# Patient Record
Sex: FEMALE | Race: WHITE | NOT HISPANIC OR LATINO | Employment: FULL TIME | URBAN - METROPOLITAN AREA
[De-identification: names, ages, dates, MRNs, and addresses within clinical notes are randomized per-mention and may not be internally consistent; named-entity substitution may affect disease eponyms.]

---

## 2020-10-30 ENCOUNTER — HOSPITAL ENCOUNTER (EMERGENCY)
Facility: HOSPITAL | Age: 30
Discharge: HOME | End: 2020-10-31
Attending: EMERGENCY MEDICINE | Admitting: EMERGENCY MEDICINE
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
## 2020-10-30 PROCEDURE — 99283 EMERGENCY DEPT VISIT LOW MDM: CPT

## 2020-10-31 VITALS
SYSTOLIC BLOOD PRESSURE: 126 MMHG | DIASTOLIC BLOOD PRESSURE: 87 MMHG | WEIGHT: 145 LBS | RESPIRATION RATE: 16 BRPM | HEART RATE: 73 BPM | OXYGEN SATURATION: 100 % | HEIGHT: 64 IN | BODY MASS INDEX: 24.75 KG/M2 | TEMPERATURE: 96.8 F

## 2020-10-31 LAB
ALBUMIN SERPL-MCNC: 4.7 G/DL (ref 3.5–5.2)
ALBUMIN/GLOB SERPL: 2.1 G/DL
ALP SERPL-CCNC: 48 U/L (ref 39–117)
ALT SERPL W P-5'-P-CCNC: 7 U/L (ref 1–33)
ANION GAP SERPL CALCULATED.3IONS-SCNC: 11 MMOL/L (ref 5–15)
AST SERPL-CCNC: 13 U/L (ref 1–32)
BILIRUB SERPL-MCNC: 0.4 MG/DL (ref 0–1.2)
BUN SERPL-MCNC: 14 MG/DL (ref 6–20)
BUN/CREAT SERPL: 18.2 (ref 7–25)
CALCIUM SPEC-SCNC: 9.6 MG/DL (ref 8.6–10.5)
CHLORIDE SERPL-SCNC: 104 MMOL/L (ref 98–107)
CO2 SERPL-SCNC: 26 MMOL/L (ref 22–29)
CREAT SERPL-MCNC: 0.77 MG/DL (ref 0.57–1)
DEPRECATED RDW RBC AUTO: 40 FL (ref 37–54)
ERYTHROCYTE [DISTWIDTH] IN BLOOD BY AUTOMATED COUNT: 11.7 % (ref 12.3–15.4)
GFR SERPL CREATININE-BSD FRML MDRD: 90 ML/MIN/1.73
GLOBULIN UR ELPH-MCNC: 2.2 GM/DL
GLUCOSE SERPL-MCNC: 133 MG/DL (ref 65–99)
HBV SURFACE AB SER RIA-ACNC: NORMAL
HBV SURFACE AG SERPL QL IA: NORMAL
HCT VFR BLD AUTO: 41.4 % (ref 34–46.6)
HCV AB SER DONR QL: NORMAL
HGB BLD-MCNC: 13.7 G/DL (ref 12–15.9)
HIV1+2 AB SER QL: NORMAL
MCH RBC QN AUTO: 30.7 PG (ref 26.6–33)
MCHC RBC AUTO-ENTMCNC: 33.1 G/DL (ref 31.5–35.7)
MCV RBC AUTO: 92.8 FL (ref 79–97)
PLATELET # BLD AUTO: 310 10*3/MM3 (ref 140–450)
PMV BLD AUTO: 9.2 FL (ref 6–12)
POTASSIUM SERPL-SCNC: 3.4 MMOL/L (ref 3.5–5.2)
PROT SERPL-MCNC: 6.9 G/DL (ref 6–8.5)
RBC # BLD AUTO: 4.46 10*6/MM3 (ref 3.77–5.28)
SODIUM SERPL-SCNC: 141 MMOL/L (ref 136–145)
WBC # BLD AUTO: 5.77 10*3/MM3 (ref 3.4–10.8)

## 2020-10-31 PROCEDURE — 87340 HEPATITIS B SURFACE AG IA: CPT | Performed by: PHYSICIAN ASSISTANT

## 2020-10-31 PROCEDURE — 86706 HEP B SURFACE ANTIBODY: CPT | Performed by: PHYSICIAN ASSISTANT

## 2020-10-31 PROCEDURE — 85027 COMPLETE CBC AUTOMATED: CPT | Performed by: PHYSICIAN ASSISTANT

## 2020-10-31 PROCEDURE — 86803 HEPATITIS C AB TEST: CPT | Performed by: PHYSICIAN ASSISTANT

## 2020-10-31 PROCEDURE — G0432 EIA HIV-1/HIV-2 SCREEN: HCPCS | Performed by: PHYSICIAN ASSISTANT

## 2020-10-31 PROCEDURE — 80053 COMPREHEN METABOLIC PANEL: CPT | Performed by: PHYSICIAN ASSISTANT

## 2021-10-03 PROCEDURE — U0004 COV-19 TEST NON-CDC HGH THRU: HCPCS

## 2022-05-20 ENCOUNTER — OFFICE VISIT (OUTPATIENT)
Dept: OBSTETRICS AND GYNECOLOGY | Facility: CLINIC | Age: 32
End: 2022-05-20

## 2022-05-20 VITALS — DIASTOLIC BLOOD PRESSURE: 80 MMHG | BODY MASS INDEX: 25.06 KG/M2 | SYSTOLIC BLOOD PRESSURE: 118 MMHG | WEIGHT: 146 LBS

## 2022-05-20 DIAGNOSIS — Z01.419 PAP TEST, AS PART OF ROUTINE GYNECOLOGICAL EXAMINATION: Primary | ICD-10-CM

## 2022-05-20 DIAGNOSIS — Z30.431 IUD CHECK UP: ICD-10-CM

## 2022-05-20 DIAGNOSIS — Z01.419 WOMEN'S ANNUAL ROUTINE GYNECOLOGICAL EXAMINATION: ICD-10-CM

## 2022-05-20 PROCEDURE — 99395 PREV VISIT EST AGE 18-39: CPT | Performed by: OBSTETRICS & GYNECOLOGY

## 2022-05-20 NOTE — PROGRESS NOTES
GYN Annual Exam     CC - Here for annual exam.        HPI  Ilsa Pablo is a 31 y.o. female, No obstetric history on file., who presents for annual well woman exam. Patient's last menstrual period was 05/15/2022 (exact date)..  Periods are absent secondary to birth control.  Dysmenorrhea:mild- moderate.  Patient reports problems with: none.  There were no changes to her medical or surgical history since her last visit.. Partner Status: Marital Status: .  She is sexually active. She has not had new partners since her last STD testing. She does not desire STD testing. She does not use condoms with IC..    Additional OB/GYN History   Current contraception: contraceptive methods: kyleena IUD.  Insertion date: 7/7/2020  Desires to: continue contraception  Last Pap : 6/22/20. Result: normal  Last Completed Pap Smear     This patient has no relevant Health Maintenance data.        History of abnormal Pap smear: yes - hpv positive more than 3 years ago, patient was not see us at the time.   Family history of uterine, colon, breast, or ovarian cancer: yes - [facility]- breast  Performs monthly Self-Breast Exam: yes  Exercises Regularly:no  Feelings of Anxiety or Depression: no  Tobacco Usage?: No   OB History    No obstetric history on file.         Health Maintenance   Topic Date Due   • Annual Gynecologic Pelvic and Breast Exam  Never done   • ANNUAL PHYSICAL  Never done   • PAP SMEAR  Never done   • COVID-19 Vaccine (3 - Booster for Moderna series) 07/09/2021   • INFLUENZA VACCINE  08/01/2022   • TDAP/TD VACCINES (2 - Td or Tdap) 10/30/2022   • HEPATITIS C SCREENING  Completed   • Pneumococcal Vaccine 0-64  Aged Out       The additional following portions of the patient's history were reviewed and updated as appropriate: allergies, current medications, past family history, past medical history, past social history, past surgical history and problem list.    Review of Systems   Constitutional: Negative for fever,  unexpected weight gain and unexpected weight loss.   Eyes: Negative for visual disturbance.   Respiratory: Negative for cough and shortness of breath.    Cardiovascular: Negative for chest pain.   Gastrointestinal: Negative for abdominal pain, blood in stool, constipation and diarrhea.   Endocrine: Negative for cold intolerance and heat intolerance.   Genitourinary: Negative for amenorrhea, breast discharge, breast lump, dyspareunia, menstrual problem and vaginal pain.   Musculoskeletal: Negative for back pain and myalgias.   Skin: Negative for dry skin and skin lesions.   Neurological: Negative for headache.   Psychiatric/Behavioral: Negative for suicidal ideas and depressed mood.         I have reviewed and agree with the HPI, ROS, and historical information as entered above. Gracy Srivastava MD    Objective   /80   Wt 66.2 kg (146 lb)   LMP 05/15/2022 (Exact Date)   BMI 25.06 kg/m²     Physical Exam  Vitals and nursing note reviewed. Exam conducted with a chaperone present.   Constitutional:       Appearance: She is well-developed.   HENT:      Head: Normocephalic and atraumatic.   Eyes:      Pupils: Pupils are equal, round, and reactive to light.   Neck:      Thyroid: No thyroid mass or thyromegaly.   Pulmonary:      Effort: Pulmonary effort is normal. No retractions.   Chest:      Chest wall: No mass.   Breasts:      Right: Normal. No mass, nipple discharge, skin change or tenderness.      Left: Normal. No mass, nipple discharge, skin change or tenderness.       Abdominal:      General: Bowel sounds are normal.      Palpations: Abdomen is soft. Abdomen is not rigid. There is no mass.      Tenderness: There is no abdominal tenderness. There is no guarding.      Hernia: No hernia is present. There is no hernia in the left inguinal area or right inguinal area.   Genitourinary:     Exam position: Lithotomy position.      Pubic Area: No rash.       Labia:         Right: No rash, tenderness or lesion.          Left: No rash, tenderness or lesion.       Urethra: No urethral pain or urethral swelling.      Vagina: Normal. No vaginal discharge or lesions.      Cervix: No cervical motion tenderness, discharge, lesion or cervical bleeding.      Uterus: Normal. Not enlarged, not fixed and not tender.       Adnexa:         Right: No mass, tenderness or fullness.          Left: No mass, tenderness or fullness.        Rectum: No external hemorrhoid.   Musculoskeletal:      Cervical back: Normal range of motion. No muscular tenderness.      Right lower leg: No edema.      Left lower leg: No edema.   Skin:     General: Skin is warm and dry.   Neurological:      Mental Status: She is alert and oriented to person, place, and time.      Motor: Motor function is intact.   Psychiatric:         Mood and Affect: Mood and affect normal.         Behavior: Behavior normal.     strings seen       Assessment and Plan    Problem List Items Addressed This Visit    None     Visit Diagnoses     Pap test, as part of routine gynecological examination    -  Primary    Relevant Orders    LIQUID-BASED PAP SMEAR, [facility] ([facility])    Women's annual routine gynecological examination        IUD check up              1. GYN annual well woman exam.   2. Preconceptual Counseling.  Discussed timed intercourse, regular cycles, prenatal vitamins, and when to call.  3. Reviewed monthly self breast exams.  Instructed to call with lumps, pain, or breast discharge.    4. Reviewed exercise as a preventative health measures.   5. Reccommended Flu Vaccine in Fall of each year.  6. RTC in 1 year or PRN with problems  7. Other: she may want her iud out within the year.   Return in about 1 year (around 5/20/2023) for Annual physical.      Gracy Srivastava MD  05/20/2022

## 2022-05-25 LAB — REF LAB TEST METHOD: NORMAL

## 2023-11-14 NOTE — PROGRESS NOTES
New Patient Office Visit      Date: 11/15/2023   Patient Name: Ilsa Bruno  : 1990   MRN: [id number]     Chief Complaint:    Chief Complaint   Patient presents with    New Patient Preventive Medicine Services       History of Present Illness: Ilsa Bruno is a 32 y.o. female who is here today for new patient preventative.       Subjective      HPI:  Pt is from [place], [place]. 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  No acute concerns. Has been in generally good health over the last year.     Noticed episodes of white discoloration in her fingertips a few times which was new for her. Showed a friend who is a physician and was told it was Raynauds. No family h/o autoimmune conditions that she is aware of apart from maternal grandmother who had thyroid disease and diabetes which runs on both sides of the family. States that her family members did not go to the doctor very often so healthy history is somewhat incomplete.     Does struggle with anxiety. Following with a therapist for this. Attributes most of her anxiety to work stress. Works 6 to 7 days per week running her pharmacy. Has been trying to work reducing stress. Sleeps generally good- 8 to 9 hours. Will still feel fatigued even after a full nights sleep.  Diet is generally healthy.  cooks most meals at home. Limiting alcohol now, 4 drinks/week or less.   Gets exercise, was going to [facility] 4 days/week over the last few months. Has not been going as much recently.   Follows w OB/Gyn for women's health. Pap is UTD for . Was HPV+. Had colposcopy which was normal.  Taking PNV and does desire pregnancy in the future.  Had outbreak of HSV-1 recently which was treated w one time rx of Valtrex. Initial episode.       Review of Systems:   +Fatigue, anxiety, raynauds phenomena, skin issues/unspecified rashes. Negative joint  swelling or stiffness, swallowing difficulties, constipation, diarrhea, no vision problem. Otherwise not pertinent unless noted above in HPI.     Past Medical History:   Past Medical History:   Diagnosis Date    Abnormal Pap smear of cervix     HPV (human papilloma virus) infection     PMS (premenstrual syndrome)     Period Sxs occur around time of cycle even if no bleeding. Changes in mood, bloating, cramping. Managable    Varicella Childhood       Past Surgical History:   Past Surgical History:   Procedure Laterality Date    COLPOSCOPY      INTRAUTERINE DEVICE INSERTION         Family History:   Family History   Problem Relation Age of Onset    Hypertension Mother     Hypertension Father     Diabetes Maternal Uncle     Diabetes Maternal Grandfather     Hypertension Paternal Grandfather     Diabetes Other     Heart disease Other     Hypertension Other     Thyroid disease Other     Breast cancer Other         Maternal Great GM       Social History:   Social History     Socioeconomic History    Marital status:    Tobacco Use    Smoking status: Former     Packs/day: 0.25     Years: 0.50     Additional pack years: 0.00     Total pack years: 0.13     Types: Cigarettes     Start date: 1/1/2020     Quit date: 4/1/2020     Years since quitting: 3.6     Passive exposure: Never    Smokeless tobacco: Never   Vaping Use    Vaping Use: Never used   Substance and Sexual Activity    Alcohol use: Yes     Alcohol/week: 4.0 standard drinks of alcohol     Types: 4 Glasses of wine per week     Comment: DAILY,     Drug use: Never    Sexual activity: Yes     Partners: Male     Birth control/protection: I.U.D.       Medications:     Current Outpatient Medications:     prenatal vitamin (prenatal, CLASSIC, vitamin) tablet, Take  by mouth Daily., Disp: , Rfl:     valACYclovir (VALTREX) 1000 MG tablet, 1 tablet., Disp: , Rfl:     Allergies:   No Known Allergies    Immunizations:  Immunization History   Administered Date(s)  Administered    COVID-19 (MODERNA) 1st,2nd,3rd Dose Monovalent 01/11/2021, 02/09/2021     Pap:  Up-To-Date   Last Completed Pap Smear            PAP SMEAR (Every 3 Years) Next due on 5/20/2025 05/20/2022  LIQUID-BASED PAP SMEAR, [facility] ([facility])                   Cancer Screenings: Negative breast, colon, ovarian cancer.  Hep C (Age 18-79 once):  previously negative  HIV (Age 15-65 once): previously negative  A1c: ordered  Lipid panel: ordered      Dermatology: No  Ophthalmologist: UTD on vision screening 20/20  Dentist: Established    Tobacco Use: Medium Risk (11/15/2023)    Patient History     Smoking Tobacco Use: Former     Smokeless Tobacco Use: Never     Passive Exposure: Never       Social History     Substance and Sexual Activity   Alcohol Use Yes    Alcohol/week: 4.0 standard drinks of alcohol    Types: 4 Glasses of wine per week    Comment: DAILY,         Social History     Substance and Sexual Activity   Drug Use Never        Diet/Physical activity: Healthy, . counseled on 11/15/23      Sexual Health: using contraception (Kyleena), not attempting pregnancy   Menopause: pre-menopausal  Menstrual Cycles: regular, last menstrual cycle: unknown    PHQ-9 Depression Screening  Little interest or pleasure in doing things? 2-->more than half the days   Feeling down, depressed, or hopeless? 1-->several days   Trouble falling or staying asleep, or sleeping too much? 3-->nearly every day   Feeling tired or having little energy? 3-->nearly every day   Poor appetite or overeating? 0-->not at all   Feeling bad about yourself - or that you are a failure or have let yourself or your family down? 1-->several days   Trouble concentrating on things, such as reading the newspaper or watching television? 1-->several days   Moving or speaking so slowly that other people could have noticed? Or the opposite - being so fidgety or restless that you have been moving around a lot more than usual? 0-->not at all   Thoughts  "that you would be better off dead, or of hurting yourself in some way? 0-->not at all   PHQ-9 Total Score 11   If you checked off any problems, how difficult have these problems made it for you to do your work, take care of things at home, or get along with other people? somewhat difficult   Follows w talk therapist        Objective     Physical Exam:  Vital Signs:   Vitals:    11/15/23 0742   BP: 122/78   BP Location: Left arm   Patient Position: Sitting   Cuff Size: Adult   Pulse: 88   Temp: 98.5 °F (36.9 °C)   TempSrc: Oral   SpO2: 99%   Weight: 71.8 kg (158 lb 4.8 oz)   Height: 162.6 cm (64.02\")   PainSc: 0-No pain     Body mass index is 27.16 kg/m².    Physical Exam  Constitutional:       Appearance: She is normal weight. She is not ill-appearing.   HENT:      Head: Normocephalic and atraumatic.      Right Ear: Tympanic membrane normal.      Left Ear: Tympanic membrane normal.      Mouth/Throat:      Mouth: Mucous membranes are moist.      Pharynx: Oropharynx is clear. No oropharyngeal exudate or posterior oropharyngeal erythema.   Eyes:      Extraocular Movements: Extraocular movements intact.      Conjunctiva/sclera: Conjunctivae normal.   Cardiovascular:      Rate and Rhythm: Normal rate and regular rhythm.      Heart sounds: Normal heart sounds.   Pulmonary:      Breath sounds: Normal breath sounds. No wheezing or rhonchi.   Abdominal:      General: Abdomen is flat.      Palpations: Abdomen is soft.      Tenderness: There is no abdominal tenderness.   Musculoskeletal:         General: Normal range of motion.      Cervical back: Normal range of motion and neck supple.   Lymphadenopathy:      Cervical: No cervical adenopathy.   Neurological:      General: No focal deficit present.      Mental Status: She is alert.   Psychiatric:         Mood and Affect: Mood normal.         Thought Content: Thought content normal.           Assessment / Plan      Assessment/Plan:   Diagnoses and all orders for this " visit:    1. Encounter to establish care (Primary)    2. Annual physical exam  -     CBC (No Diff); Future  -     Lipid Panel; Future  -     Hemoglobin A1c; Future  -     Comprehensive Metabolic Panel; Future  -     TSH; Future    3. Screening for deficiency anemia  -     CBC (No Diff); Future    4. Screening for lipid disorders  -     Lipid Panel; Future    5. Raynaud's phenomenon without gangrene  -     MELIA by IFA, Reflex 9-biomarkers profile; Future  -     Anti-DNA Antibody, Double-stranded; Future    6. Other fatigue  -     MELIA by IFA, Reflex 9-biomarkers profile; Future  -     Anti-DNA Antibody, Double-stranded; Future        Healthcare Maintenance:  Fasting labs ordered  Mammo to start at 40  Colon cancer screening at 45  Pap UTD (est w Ob/Gyn)  On PNV, no contraception  Encouraged healthy diet, regular physical activity  Flu vaccine UTD 2023/2024   Politely declines COVID vaccine, has had primary series    Raynauds Phenomena  Has +fatigue, rash, raynauds but only family h/o of thyroid disease and diabetes. Will screen w TSH, A1c and add MELIA and ds-DNA although low concern for lupus or other underlying autoimmune conditions. Counseled that Raynauds phenomena can be an isolated condition. FU if sx's worsen or if labs are concerning.     Fatigue  Likely multifactorial based on lifestyle, work stressors. Check labs as above to r/o secondary cause.     Counseling provided based on age appropriate USPSTF guidelines.  BMI is >= 25 and <30. (Overweight) The following options were offered after discussion;: exercise counseling/recommendations, nutrition counseling/recommendations, and information on healthy weight added to patient's after visit summary     Ilsa Bruno voices understanding and acceptance of this advice and will call back with any further questions or concerns. AVS with preventive healthcare tips printed for patient.         Follow Up:   Return in about 1 year (around 11/15/2024) for  Annual.        Constance Mcintyre DO  [facility] [place] [address]

## 2023-11-15 ENCOUNTER — OFFICE VISIT (OUTPATIENT)
Dept: FAMILY MEDICINE CLINIC | Facility: CLINIC | Age: 33
End: 2023-11-15
Payer: COMMERCIAL

## 2023-11-15 ENCOUNTER — LAB (OUTPATIENT)
Dept: LAB | Facility: HOSPITAL | Age: 33
End: 2023-11-15
Payer: COMMERCIAL

## 2023-11-15 VITALS
OXYGEN SATURATION: 99 % | HEART RATE: 88 BPM | BODY MASS INDEX: 27.02 KG/M2 | SYSTOLIC BLOOD PRESSURE: 122 MMHG | TEMPERATURE: 98.5 F | DIASTOLIC BLOOD PRESSURE: 78 MMHG | HEIGHT: 64 IN | WEIGHT: 158.3 LBS

## 2023-11-15 DIAGNOSIS — Z00.00 ANNUAL PHYSICAL EXAM: ICD-10-CM

## 2023-11-15 DIAGNOSIS — Z76.89 ENCOUNTER TO ESTABLISH CARE: Primary | ICD-10-CM

## 2023-11-15 DIAGNOSIS — Z13.220 SCREENING FOR LIPID DISORDERS: ICD-10-CM

## 2023-11-15 DIAGNOSIS — Z13.0 SCREENING FOR DEFICIENCY ANEMIA: ICD-10-CM

## 2023-11-15 DIAGNOSIS — R53.83 OTHER FATIGUE: ICD-10-CM

## 2023-11-15 DIAGNOSIS — I73.00 RAYNAUD'S PHENOMENON WITHOUT GANGRENE: ICD-10-CM

## 2023-11-15 LAB
ALBUMIN SERPL-MCNC: 5.3 G/DL (ref 3.5–5.2)
ALBUMIN/GLOB SERPL: 2.3 G/DL
ALP SERPL-CCNC: 45 U/L (ref 39–117)
ALT SERPL W P-5'-P-CCNC: 8 U/L (ref 1–33)
ANION GAP SERPL CALCULATED.3IONS-SCNC: 11 MMOL/L (ref 5–15)
AST SERPL-CCNC: 12 U/L (ref 1–32)
BILIRUB SERPL-MCNC: 0.6 MG/DL (ref 0–1.2)
BUN SERPL-MCNC: 13 MG/DL (ref 6–20)
BUN/CREAT SERPL: 12.7 (ref 7–25)
CALCIUM SPEC-SCNC: 10 MG/DL (ref 8.6–10.5)
CHLORIDE SERPL-SCNC: 104 MMOL/L (ref 98–107)
CHOLEST SERPL-MCNC: 168 MG/DL (ref 0–200)
CO2 SERPL-SCNC: 24 MMOL/L (ref 22–29)
CREAT SERPL-MCNC: 1.02 MG/DL (ref 0.57–1)
DEPRECATED RDW RBC AUTO: 37.8 FL (ref 37–54)
EGFRCR SERPLBLD CKD-EPI 2021: 76.1 ML/MIN/1.73
ERYTHROCYTE [DISTWIDTH] IN BLOOD BY AUTOMATED COUNT: 11.8 % (ref 12.3–15.4)
GLOBULIN UR ELPH-MCNC: 2.3 GM/DL
GLUCOSE SERPL-MCNC: 91 MG/DL (ref 65–99)
HBA1C MFR BLD: 5.1 % (ref 4.8–5.6)
HCT VFR BLD AUTO: 44.2 % (ref 34–46.6)
HDLC SERPL-MCNC: 60 MG/DL (ref 40–60)
HGB BLD-MCNC: 15.2 G/DL (ref 12–15.9)
LDLC SERPL CALC-MCNC: 96 MG/DL (ref 0–100)
LDLC/HDLC SERPL: 1.6 {RATIO}
MCH RBC QN AUTO: 30.1 PG (ref 26.6–33)
MCHC RBC AUTO-ENTMCNC: 34.4 G/DL (ref 31.5–35.7)
MCV RBC AUTO: 87.5 FL (ref 79–97)
PLATELET # BLD AUTO: 297 10*3/MM3 (ref 140–450)
PMV BLD AUTO: 10.3 FL (ref 6–12)
POTASSIUM SERPL-SCNC: 4.1 MMOL/L (ref 3.5–5.2)
PROT SERPL-MCNC: 7.6 G/DL (ref 6–8.5)
RBC # BLD AUTO: 5.05 10*6/MM3 (ref 3.77–5.28)
SODIUM SERPL-SCNC: 139 MMOL/L (ref 136–145)
TRIGL SERPL-MCNC: 59 MG/DL (ref 0–150)
TSH SERPL DL<=0.05 MIU/L-ACNC: 2.23 UIU/ML (ref 0.27–4.2)
VLDLC SERPL-MCNC: 12 MG/DL (ref 5–40)
WBC NRBC COR # BLD: 5.09 10*3/MM3 (ref 3.4–10.8)

## 2023-11-15 PROCEDURE — 83036 HEMOGLOBIN GLYCOSYLATED A1C: CPT | Performed by: STUDENT IN AN ORGANIZED HEALTH CARE EDUCATION/TRAINING PROGRAM

## 2023-11-15 PROCEDURE — 80061 LIPID PANEL: CPT | Performed by: STUDENT IN AN ORGANIZED HEALTH CARE EDUCATION/TRAINING PROGRAM

## 2023-11-15 PROCEDURE — 36415 COLL VENOUS BLD VENIPUNCTURE: CPT | Performed by: STUDENT IN AN ORGANIZED HEALTH CARE EDUCATION/TRAINING PROGRAM

## 2023-11-15 PROCEDURE — 86225 DNA ANTIBODY NATIVE: CPT | Performed by: STUDENT IN AN ORGANIZED HEALTH CARE EDUCATION/TRAINING PROGRAM

## 2023-11-15 PROCEDURE — 86038 ANTINUCLEAR ANTIBODIES: CPT | Performed by: STUDENT IN AN ORGANIZED HEALTH CARE EDUCATION/TRAINING PROGRAM

## 2023-11-15 PROCEDURE — 80050 GENERAL HEALTH PANEL: CPT | Performed by: STUDENT IN AN ORGANIZED HEALTH CARE EDUCATION/TRAINING PROGRAM

## 2023-11-15 RX ORDER — VALACYCLOVIR HYDROCHLORIDE 1 G/1
1000 TABLET, FILM COATED ORAL
COMMUNITY
Start: 2023-10-25

## 2023-11-15 RX ORDER — PRENATAL VIT NO.126/IRON/FOLIC 28MG-0.8MG
TABLET ORAL DAILY
COMMUNITY

## 2023-11-15 NOTE — ASSESSMENT & PLAN NOTE
Fasting labs ordered  Mammo to start at 40  Colon cancer screening at 45  Pap UTD (est w Ob/Gyn)  On PNV, no contraception  Encouraged healthy diet, regular physical activity  Flu vaccine UTD 2023/2024   Politely declines COVID vaccine, has had primary series

## 2023-11-16 LAB — DSDNA AB SER-ACNC: 1 IU/ML (ref 0–9)

## 2023-11-17 ENCOUNTER — PATIENT ROUNDING (BHMG ONLY) (OUTPATIENT)
Dept: FAMILY MEDICINE CLINIC | Facility: CLINIC | Age: 33
End: 2023-11-17
Payer: COMMERCIAL

## 2023-11-17 LAB
ANA SER QL IF: NEGATIVE
LABORATORY COMMENT REPORT: NORMAL

## 2024-05-14 ENCOUNTER — OFFICE VISIT (OUTPATIENT)
Dept: OBSTETRICS AND GYNECOLOGY | Facility: CLINIC | Age: 34
End: 2024-05-14
Payer: COMMERCIAL

## 2024-05-14 VITALS
DIASTOLIC BLOOD PRESSURE: 72 MMHG | WEIGHT: 158 LBS | SYSTOLIC BLOOD PRESSURE: 120 MMHG | HEIGHT: 64 IN | BODY MASS INDEX: 26.98 KG/M2

## 2024-05-14 DIAGNOSIS — Z30.09 FAMILY PLANNING: Primary | ICD-10-CM

## 2024-05-14 PROCEDURE — 99213 OFFICE O/P EST LOW 20 MIN: CPT | Performed by: OBSTETRICS & GYNECOLOGY

## 2024-05-14 NOTE — PROGRESS NOTES
Chief Complaint   Patient presents with    Follow-up       Subjective   HPI  Ilsa Bruno is a 33 y.o. female, , LMP was on Patient's last menstrual period was 2024 (exact date). who presents for preconceptual counseling.    Her periods are regular every 26 days, lasting 5 days.  Dysmenorrhea:mild, occurring first 1-2 days of flow. The patient reports additional symptoms as none.  She is currently trying to conceive. She has intercourse approximately every other day on daily during her ovulation cycle Her past medical history is noted for: no medical issues. She has not had a previous workup for infertility. Partner Status: Marital Status: . Her partner has not fathered children. His past medical history is notable for no medical issues. Her IUD was pulled 25 and she has been trying to conceive since then. She has positive ovulation predictor kits.    Her PCP did routine labs this past  that were normal.       Current Outpatient Medications on File Prior to Visit   Medication Sig Dispense Refill    prenatal vitamin (prenatal, CLASSIC, vitamin) tablet Take  by mouth Daily.      valACYclovir (VALTREX) 1000 MG tablet 1 tablet.       No current facility-administered medications on file prior to visit.        Additional OB/GYN History   Last Pap :   Last Completed Pap Smear            PAP SMEAR (Every 3 Years) Next due on 2022  LIQUID-BASED PAP SMEAR, [facility] ([facility])                  History of abnormal Pap smear: no  Exercises Regularly: yes  Feelings of Anxiety or Depression: no  Tobacco Usage?: No   OB History          0    Para   0    Term   0       0    AB   0    Living   0         SAB   0    IAB   0    Ectopic   0    Molar   0    Multiple   0    Live Births   0                  Current Outpatient Medications:     prenatal vitamin (prenatal, CLASSIC, vitamin) tablet, Take  by mouth Daily., Disp: , Rfl:     valACYclovir (VALTREX) 1000  "MG tablet, 1 tablet., Disp: , Rfl:      Past Medical History:   Diagnosis Date    Abnormal Pap smear of cervix     HPV (human papilloma virus) infection     PMS (premenstrual syndrome)     Period Sxs occur around time of cycle even if no bleeding. Changes in mood, bloating, cramping. Managable    Varicella Childhood        Past Surgical History:   Procedure Laterality Date    COLPOSCOPY      INTRAUTERINE DEVICE INSERTION         The additional following portions of the patient's history were reviewed and updated as appropriate: allergies, current medications, past medical history, past social history, past surgical history, and problem list.    Review of Systems   All other systems reviewed and are negative.    All other systems reviewed and are negative.     I have reviewed and agree with the HPI, ROS, and historical information as entered above. Gracy Srivastava MD      Objective   /72   Ht 162.6 cm (64\")   Wt 71.7 kg (158 lb)   LMP 04/29/2024 (Exact Date)   BMI 27.12 kg/m²     Physical Exam  Vitals and nursing note reviewed.   Constitutional:       Appearance: Normal appearance.   HENT:      Head: Normocephalic and atraumatic.   Eyes:      General: No scleral icterus.     Pupils: Pupils are equal, round, and reactive to light.   Pulmonary:      Effort: Pulmonary effort is normal.      Breath sounds: Normal breath sounds.   Abdominal:      General: Bowel sounds are normal. There is no distension.      Palpations: Abdomen is soft.      Tenderness: There is no abdominal tenderness.   Musculoskeletal:         General: Normal range of motion.      Cervical back: Normal range of motion and neck supple.      Right lower leg: No edema.      Left lower leg: No edema.   Skin:     General: Skin is warm and dry.   Neurological:      General: No focal deficit present.      Mental Status: She is alert and oriented to person, place, and time.   Psychiatric:         Mood and Affect: Mood normal.         Behavior: Behavior " normal. Behavior is cooperative.         Assessment & Plan     Assessment and Plan    Problem List Items Addressed This Visit    None  Visit Diagnoses       Family planning    -  Primary            Ovulatory cycles discussed with the patient.  She understands the concept of timed intercourse.  We also discussed the importance of prenatal vitamins and folic acid.  Reviewed that only 30% of people get pregnant by 3 months, 50% x 6 months, and 90% by year.  Should the patient attempt to conceive for greater than 1 year she should return to the clinic for evaluation.  SA is next step  FU for US  Discussed option of clomid if SA is normal before moving on to eval for endo/tubal patency.   No follow-ups on file.      Gracy Srivastava MD  05/14/2024

## 2024-05-28 ENCOUNTER — OFFICE VISIT (OUTPATIENT)
Dept: OBSTETRICS AND GYNECOLOGY | Facility: CLINIC | Age: 34
End: 2024-05-28
Payer: COMMERCIAL

## 2024-05-28 VITALS
WEIGHT: 158.6 LBS | HEIGHT: 64 IN | SYSTOLIC BLOOD PRESSURE: 122 MMHG | BODY MASS INDEX: 27.08 KG/M2 | DIASTOLIC BLOOD PRESSURE: 84 MMHG

## 2024-05-28 DIAGNOSIS — Z31.9 INFERTILITY MANAGEMENT: Primary | ICD-10-CM

## 2024-05-28 DIAGNOSIS — Z30.09 FAMILY PLANNING: ICD-10-CM

## 2024-05-28 PROCEDURE — 99213 OFFICE O/P EST LOW 20 MIN: CPT | Performed by: OBSTETRICS & GYNECOLOGY

## 2024-05-28 RX ORDER — CIPROFLOXACIN HYDROCHLORIDE 3.5 MG/ML
SOLUTION/ DROPS TOPICAL
COMMUNITY
Start: 2024-03-29

## 2024-05-28 NOTE — PROGRESS NOTES
Chief Complaint   Patient presents with    Follow-up     infertility       Subjective   HPI  Ilsa Bruno is a 33 y.o. female, . Her last LMP was Patient's last menstrual period was 2024 (exact date). who presents for follow up on infertility. Having menstrual cycles every 28-29 days.  Pt was last seen on 2024 by Dr. Srivastava for preconceptual counseling. At last appointment, Dr. Srivastava discussed option of clomid if SA is normal before moving on to eval for endo/tubal patency.       Pt having regular periods and having timed intercourse as well as positive ovulation kits. Pt has been trying to conceive since 2023 when she had her IUD pulled.     Pt's  semen analysis was done on 2024 and was abnormal and plans to repeat in 8 weeks.     At her last visit she was treated with expectant management.  The patient reports additional symptoms as none.        US was done today. Uterus normal, follicles on bilateral ovaries.     Additional OB/GYN History     Last Pap : 2022 neg, HPV neg  Last Completed Pap Smear            PAP SMEAR (Every 3 Years) Next due on 2022  LIQUID-BASED PAP SMEAR, [facility] ([facility])                    Last mammogram:   Last Completed Mammogram       This patient has no relevant Health Maintenance data.            Tobacco Usage?: No   OB History          0    Para   0    Term   0       0    AB   0    Living   0         SAB   0    IAB   0    Ectopic   0    Molar   0    Multiple   0    Live Births   0                  Current Outpatient Medications:     ciprofloxacin (CILOXAN) 0.3 % ophthalmic solution, , Disp: , Rfl:     prenatal vitamin (prenatal, CLASSIC, vitamin) tablet, Take  by mouth Daily., Disp: , Rfl:     valACYclovir (VALTREX) 1000 MG tablet, 1 tablet., Disp: , Rfl:      Past Medical History:   Diagnosis Date    Abnormal Pap smear of cervix     Herpes 10/2023    HPV (human papilloma virus) infection      "PMS (premenstrual syndrome)     Period Sxs occur around time of cycle even if no bleeding. Changes in mood, bloating, cramping. Managable    Varicella Childhood        Past Surgical History:   Procedure Laterality Date    COLPOSCOPY      INTRAUTERINE DEVICE INSERTION         The additional following portions of the patient's history were reviewed and updated as appropriate: allergies, current medications, past family history, past medical history, past social history, past surgical history, and problem list.    Review of Systems   Constitutional: Negative.    HENT: Negative.     Eyes: Negative.    Respiratory: Negative.     Cardiovascular: Negative.    Gastrointestinal: Negative.    Endocrine: Negative.    Genitourinary: Negative.    Musculoskeletal: Negative.    Skin: Negative.    Allergic/Immunologic: Negative.    Neurological: Negative.    Hematological: Negative.    Psychiatric/Behavioral: Negative.         I have reviewed and agree with the HPI, ROS, and historical information as entered above. Gracy Srivastava MD      Objective   /84   Ht 162.6 cm (64\")   Wt 71.9 kg (158 lb 9.6 oz)   LMP 05/28/2024 (Exact Date)   BMI 27.22 kg/m²     Physical Exam  Vitals and nursing note reviewed.   Constitutional:       Appearance: Normal appearance.   HENT:      Head: Normocephalic and atraumatic.   Eyes:      General: No scleral icterus.     Pupils: Pupils are equal, round, and reactive to light.   Pulmonary:      Effort: Pulmonary effort is normal.      Breath sounds: Normal breath sounds.   Abdominal:      General: Bowel sounds are normal. There is no distension.      Palpations: Abdomen is soft.      Tenderness: There is no abdominal tenderness.   Musculoskeletal:         General: Normal range of motion.      Cervical back: Normal range of motion and neck supple.      Right lower leg: No edema.      Left lower leg: No edema.   Skin:     General: Skin is warm and dry.   Neurological:      General: No focal deficit " present.      Mental Status: She is alert and oriented to person, place, and time.   Psychiatric:         Mood and Affect: Mood normal.         Behavior: Behavior normal. Behavior is cooperative.         Assessment & Plan     Assessment     Problem List Items Addressed This Visit    None  Visit Diagnoses       Infertility management    -  Primary    Relevant Orders    US Non-ob Transvaginal (Completed)    Family planning                Labs with PCP, US essentially normal today. She is having regular, ovulatory periods. SA abnl today.     Plan     All questions answered.  Will sched repeat SA in 8 weeks per Dr Celeste cedillo. Will FU after this is done to make plan. Discussed appt with MARIANA if repeat is abnl.   No follow-ups on file.  I spent 20 minutes caring for Ilsa on this date of service. This time includes time spent by me in the following activities:preparing for the visit, reviewing tests, obtaining and/or reviewing a separately obtained history, ordering medications, tests, or procedures, and documenting information in the medical record          Gracy Srivastava MD  05/28/2024

## 2024-07-18 ENCOUNTER — SPECIALTY PHARMACY (OUTPATIENT)
Dept: PHARMACY | Facility: TELEHEALTH | Age: 34
End: 2024-07-18
Payer: COMMERCIAL

## 2024-07-18 PROBLEM — N97.9 FEMALE INFERTILITY: Status: ACTIVE | Noted: 2024-07-18

## 2024-07-18 NOTE — PROGRESS NOTES
Specialty Pharmacy Patient Management Program  Initial Assessment     Ilsa Bruno is a 33 y.o. female with infertility and enrolled in the Patient Management program offered by [facility]. An initial outreach was conducted, including assessment of therapy appropriateness and specialty medication education for Ovidrel 250 mcg/0.5 ml injection. The patient was introduced to services offered by [facility], including: regular assessments, refill coordination, curbside pick-up or mail order delivery options, prior authorization maintenance, and financial assistance programs as applicable. The patient was also provided with contact information for the pharmacy team.     Insurance Coverage & Financial Support  [employer]      Relevant Past Medical History and Comorbidities  Relevant medical history and concomitant health conditions were discussed with the patient. The patient's chart has been reviewed for relevant past medical history and comorbid health conditions and updated as necessary.   Past Medical History:   Diagnosis Date    Abnormal Pap smear of cervix     Female infertility 2024    Herpes 10/2023    HPV (human papilloma virus) infection     PMS (premenstrual syndrome)     Period Sxs occur around time of cycle even if no bleeding. Changes in mood, bloating, cramping. Managable    Varicella Childhood     Social History     Socioeconomic History    Marital status:    Tobacco Use    Smoking status: Former     Current packs/day: 0.00     Average packs/day: 0.3 packs/day for 0.5 years (0.1 ttl pk-yrs)     Types: Cigarettes     Start date: 2020     Quit date: 2020     Years since quittin.2     Passive exposure: Never    Smokeless tobacco: Never   Vaping Use    Vaping status: Never Used   Substance and Sexual Activity    Alcohol use: Yes     Alcohol/week: 4.0 standard drinks of alcohol     Types: 4 Glasses of wine per week     Comment: DAILY,      Drug use: Never    Sexual activity: Yes     Partners: Male     Birth control/protection: None     Problem list reviewed by Lainey Schulz PharmD on 7/18/2024 at  1:58 PM    Allergies  Known allergies and reactions were discussed with the patient. The patient's chart has been reviewed for allergy information and updated as necessary.   Patient has no known allergies.  Allergies reviewed by Lainey Schulz PharmD on 7/18/2024 at  1:58 PM    Current Medication List  This medication list has been reviewed with the patient and evaluated for any interactions or necessary modifications/recommendations, and updated to include all prescription medications, OTC medications, and supplements the patient is currently taking. This list reflects what is contained in the patient's profile, which has also been marked as reviewed to communicate to other providers it is the most up to date version of the patient's current medication therapy.     Current Outpatient Medications:     Choriogonadotropin Collins (Ovidrel) 250 MCG/0.5ML injection, Inject 0.5 mL under the skin into the appropriate area as directed, Disp: 0.5 mL, Rfl: 5    ciprofloxacin (CILOXAN) 0.3 % ophthalmic solution, , Disp: , Rfl:     prenatal vitamin (prenatal, CLASSIC, vitamin) tablet, Take  by mouth Daily., Disp: , Rfl:     valACYclovir (VALTREX) 1000 MG tablet, 1 tablet., Disp: , Rfl:   Medicines reviewed by Lainey Schulz PharmD on 7/18/2024 at  1:58 PM    Drug Interactions  none     Relevant Laboratory Values  Lab Results   Component Value Date    GLUCOSE 91 11/15/2023    CALCIUM 10.0 11/15/2023     11/15/2023    K 4.1 11/15/2023    CO2 24.0 11/15/2023     11/15/2023    BUN 13 11/15/2023    CREATININE 1.02 (H) 11/15/2023    BCR 12.7 11/15/2023    ANIONGAP 11.0 11/15/2023     Lab Results   Component Value Date    WBC 5.09 11/15/2023    HGB 15.2 11/15/2023    HCT 44.2 11/15/2023    MCV 87.5 11/15/2023     11/15/2023     Lab Value Review  The  above lab values have been reviewed; the following specialty medication(s) dose adjustment(s) are recommended: none.    Initial Education Provided for Specialty Medication  The patient has been provided with the following education and any applicable administration techniques (i.e. self-injection) have been demonstrated for the therapies indicated. All questions and concerns have been addressed prior to the patient receiving the medication, and the patient has verbalized understanding of the education and any materials provided. Additional patient education shall be provided and documented upon request by the patient, provider or payer.        Human chorionic gonadotropin (Pregnyl, Novarel, Ovidrel)    Medication Expectations   Why am I taking this medication? You are taking this medication because you are trying to become pregnant.   What should I expect while on this medication? Triggered ovulation 24-36 hours after shot administered   How does the medication work? As part of an assisted reproductive technology (ART) program, induces ovulation (trigger shot) in infertile females who have been pretreated with follicle stimulating hormones (FSH); induces ovulation and pregnancy in infertile females when the cause of infertility is functional.   How long will I be on this medication for? The amount of time you will be on this medication will be determined by your doctor and your response to the medication.    How do I take this medication? Take as directed on your prescription label. This medication is an injectable.  You may have to mix this medication yourself or you may receive a prefilled pen.   What are some possible side effects? Potential side effects include headache, drowsiness or dizziness, vaginal bleeding, injection site reactions, upper respiratory tract infections, stomach pain, nausea, acne, breast tenderness, headache, mood changes, such as irritability, restless feelings, or anger, pain, irritation,  or inflammation at site where injected.   What happens if I miss a dose? If you miss a dose, you must call your doctor immediately.  They will instruct you on what to do.     Medication Safety   What are things I should warn my doctor immediately about? Hypersensitivity reactions - trouble breathing or swallowing. If you show signs of ovarian hyperstimulation syndrome (severe pain in your lower stomach, nausea, vomiting, diarrhea, bloating, rapid weight gain, little or no urination, or trouble breathing). Serious pulmonary complications such as fever with shortness of breath or rapid breathing. Signs of a blood clot, sudden numbness or weakness, problems with vision or speech, swelling or redness in an arm or leg.   What are things that I should be cautious of? Hypersensitivity reactions and injection site reactions.   What are some medications that can interact with this one? There are no significant drug interactions.     Medication Storage/Handling   How should I handle this medication? Keep this medication out of reach of pets/children in original container.   How does this medication need to be stored? Prefilled syringe: Prior to dispensing, store at 2°C to 8°C (36°F to 46°F). Patient may store at 25°C (77°F) for up to 30 days. Protect from light.   How should I dispose of this medication? Throw away any unused medicine after the expiration date.     Resources/Support   How can I remind myself to take this medication? There are several reminder apps available.  There are also printable calendars online.  Your doctor may also provide you with a detailed schedule for you to see.   Is financial support available?  There are a wide variety of discount cards available if your medication is not approved by your insurance.    Which vaccines are recommended for me? Talk to your doctor about these vaccines: Flu, Coronavirus (COVID-19), Pneumococcal (pneumonia), Tdap, Hepatitis B.       Adherence, Self-Administration,  and Current Therapy Problems  Adherence related to the patient's specialty therapy was discussed with the patient. The Adherence segment of this outreach has been reviewed and updated.          Additional Barriers to Patient Self-Administration: None  Methods for Supporting Patient Self-Administration: N/A    Open Medication Therapy Problems  No medication therapy recommendations to display    Goals of Therapy   Goals Addressed Today    None         Reassessment Plan & Follow-Up  Medication Therapy Changes: Patient initiating Ovidrel in the next week or 2. She will be taking with Clomid which she is receiving from another pharmacy. Instructed patient to call us if/when she needs a refill.  Additional Plans, Therapy Recommendations, or Therapy Problems to Be Addressed: none   Pharmacist to perform regular reassessments no more than (6) months from the previous assessment.  Welcome information and patient satisfaction survey to be sent by retail team with patient's initial fill.  Care Coordinator to set up future refill outreaches, coordinate prescription delivery, and escalate clinical questions to pharmacist.     Attestation  I attest the patient was actively involved in and has agreed to the above plan of care. I attest that the initiated specialty medication(s) are appropriate for the patient based on my assessment. If the prescribed therapy is at any point deemed not appropriate based on the current or future assessments, a consultation will be initiated with the patient's specialty care provider to determine the best course of action. The revised plan of therapy will be documented along with any reassessments and/or additional patient education provided.     Electronically signed by Lainey Schulz PharmD, 07/18/24, 2:03 PM EDT.

## 2024-07-19 ENCOUNTER — SPECIALTY PHARMACY (OUTPATIENT)
Dept: PHARMACY | Facility: HOSPITAL | Age: 34
End: 2024-07-19
Payer: COMMERCIAL

## 2024-07-23 ENCOUNTER — TRANSCRIBE ORDERS (OUTPATIENT)
Dept: ADMINISTRATIVE | Facility: HOSPITAL | Age: 34
End: 2024-07-23
Payer: COMMERCIAL

## 2024-07-23 DIAGNOSIS — Z31.41 FERTILITY TESTING: Primary | ICD-10-CM

## 2024-07-30 ENCOUNTER — HOSPITAL ENCOUNTER (OUTPATIENT)
Dept: GENERAL RADIOLOGY | Facility: HOSPITAL | Age: 34
Discharge: HOME | End: 2024-07-30
Payer: COMMERCIAL

## 2024-07-30 DIAGNOSIS — Z31.41 FERTILITY TESTING: ICD-10-CM

## 2024-07-30 PROCEDURE — 74740 X-RAY FEMALE GENITAL TRACT: CPT

## 2024-07-30 PROCEDURE — 25510000001 IOPAMIDOL 61 % SOLUTION: Performed by: SPECIALIST

## 2024-07-30 RX ADMIN — IOPAMIDOL 20 ML: 612 INJECTION, SOLUTION INTRAVENOUS at 08:38

## 2024-08-23 ENCOUNTER — SPECIALTY PHARMACY (OUTPATIENT)
Dept: PHARMACY | Facility: TELEHEALTH | Age: 34
End: 2024-08-23
Payer: COMMERCIAL

## 2024-08-23 NOTE — PROGRESS NOTES
Specialty Pharmacy Patient Management Program  Initial Assessment     Ilsa Bruno is a 33 y.o. female with female infertility and enrolled in the Patient Management program offered by [facility]. An initial outreach was conducted, including assessment of therapy appropriateness and specialty medication education for Ovidrel 250 mcg/0.5 ml . The patient was introduced to services offered by [facility], including: regular assessments, refill coordination, curbside pick-up or mail order delivery options, prior authorization maintenance, and financial assistance programs as applicable. The patient was also provided with contact information for the pharmacy team.     Insurance Coverage & Financial Support  [employer]      Relevant Past Medical History and Comorbidities  Relevant medical history and concomitant health conditions were discussed with the patient. The patient's chart has been reviewed for relevant past medical history and comorbid health conditions and updated as necessary.   Past Medical History:   Diagnosis Date    Abnormal Pap smear of cervix     Female infertility 2024    Herpes 10/2023    HPV (human papilloma virus) infection     PMS (premenstrual syndrome)     Period Sxs occur around time of cycle even if no bleeding. Changes in mood, bloating, cramping. Managable    Varicella Childhood     Social History     Socioeconomic History    Marital status:    Tobacco Use    Smoking status: Former     Current packs/day: 0.00     Average packs/day: 0.3 packs/day for 0.5 years (0.1 ttl pk-yrs)     Types: Cigarettes     Start date: 2020     Quit date: 2020     Years since quittin.3     Passive exposure: Never    Smokeless tobacco: Never   Vaping Use    Vaping status: Never Used   Substance and Sexual Activity    Alcohol use: Yes     Alcohol/week: 4.0 standard drinks of alcohol     Types: 4 Glasses of wine per week     Comment: DAILY,      Drug use: Never    Sexual activity: Yes     Partners: Male     Birth control/protection: None     Problem list reviewed by Lainey Schulz PharmD on 8/23/2024 at  3:40 PM    Allergies  Known allergies and reactions were discussed with the patient. The patient's chart has been reviewed for allergy information and updated as necessary.   Patient has no known allergies.  Allergies reviewed by Lainey Schulz PharmD on 8/23/2024 at  3:40 PM    Current Medication List  This medication list has been reviewed with the patient and evaluated for any interactions or necessary modifications/recommendations, and updated to include all prescription medications, OTC medications, and supplements the patient is currently taking. This list reflects what is contained in the patient's profile, which has also been marked as reviewed to communicate to other providers it is the most up to date version of the patient's current medication therapy.     Current Outpatient Medications:     Choriogonadotropin Collins (Ovidrel) 250 MCG/0.5ML injection, Inject 0.5 mL under the skin into the appropriate area as directed, Disp: 0.5 mL, Rfl: 5    ciprofloxacin (CILOXAN) 0.3 % ophthalmic solution, , Disp: , Rfl:     prenatal vitamin (prenatal, CLASSIC, vitamin) tablet, Take  by mouth Daily., Disp: , Rfl:     valACYclovir (VALTREX) 1000 MG tablet, 1 tablet., Disp: , Rfl:   No current facility-administered medications for this visit.  Medicines reviewed by Lainey Schulz PharmD on 8/23/2024 at  3:40 PM    Drug Interactions  none     Relevant Laboratory Values  Lab Results   Component Value Date    GLUCOSE 91 11/15/2023    CALCIUM 10.0 11/15/2023     11/15/2023    K 4.1 11/15/2023    CO2 24.0 11/15/2023     11/15/2023    BUN 13 11/15/2023    CREATININE 1.02 (H) 11/15/2023    BCR 12.7 11/15/2023    ANIONGAP 11.0 11/15/2023     Lab Results   Component Value Date    WBC 5.09 11/15/2023    HGB 15.2 11/15/2023    HCT 44.2 11/15/2023    MCV 87.5  11/15/2023     11/15/2023     Lab Value Review  The above lab values have been reviewed; the following specialty medication(s) dose adjustment(s) are recommended: none.    Initial Education Provided for Specialty Medication  The patient has been provided with the following education and any applicable administration techniques (i.e. self-injection) have been demonstrated for the therapies indicated. All questions and concerns have been addressed prior to the patient receiving the medication, and the patient has verbalized understanding of the education and any materials provided. Additional patient education shall be provided and documented upon request by the patient, provider or payer.        Human chorionic gonadotropin (Pregnyl, Novarel, Ovidrel)    Medication Expectations   Why am I taking this medication? You are taking this medication because you are trying to become pregnant.   What should I expect while on this medication? Triggered ovulation 24-36 hours after shot administered   How does the medication work? As part of an assisted reproductive technology (ART) program, induces ovulation (trigger shot) in infertile females who have been pretreated with follicle stimulating hormones (FSH); induces ovulation and pregnancy in infertile females when the cause of infertility is functional.   How long will I be on this medication for? The amount of time you will be on this medication will be determined by your doctor and your response to the medication.    How do I take this medication? Take as directed on your prescription label. This medication is an injectable.  You may have to mix this medication yourself or you may receive a prefilled pen.   What are some possible side effects? Potential side effects include headache, drowsiness or dizziness, vaginal bleeding, injection site reactions, upper respiratory tract infections, stomach pain, nausea, acne, breast tenderness, headache, mood changes, such as  irritability, restless feelings, or anger, pain, irritation, or inflammation at site where injected.   What happens if I miss a dose? If you miss a dose, you must call your doctor immediately.  They will instruct you on what to do.     Medication Safety   What are things I should warn my doctor immediately about? Hypersensitivity reactions - trouble breathing or swallowing. If you show signs of ovarian hyperstimulation syndrome (severe pain in your lower stomach, nausea, vomiting, diarrhea, bloating, rapid weight gain, little or no urination, or trouble breathing). Serious pulmonary complications such as fever with shortness of breath or rapid breathing. Signs of a blood clot, sudden numbness or weakness, problems with vision or speech, swelling or redness in an arm or leg.   What are things that I should be cautious of? Hypersensitivity reactions and injection site reactions.   What are some medications that can interact with this one? There are no significant drug interactions.     Medication Storage/Handling   How should I handle this medication? Keep this medication out of reach of pets/children in original container.   How does this medication need to be stored? Prefilled syringe: Prior to dispensing, store at 2°C to 8°C (36°F to 46°F). Patient may store at 25°C (77°F) for up to 30 days. Protect from light.   How should I dispose of this medication? Throw away any unused medicine after the expiration date.     Resources/Support   How can I remind myself to take this medication? There are several reminder apps available.  There are also printable calendars online.  Your doctor may also provide you with a detailed schedule for you to see.   Is financial support available?  There are a wide variety of discount cards available if your medication is not approved by your insurance.    Which vaccines are recommended for me? Talk to your doctor about these vaccines: Flu, Coronavirus (COVID-19), Pneumococcal  (pneumonia), Tdap, Hepatitis B.       Adherence, Self-Administration, and Current Therapy Problems  Adherence related to the patient's specialty therapy was discussed with the patient. The Adherence segment of this outreach has been reviewed and updated.          Additional Barriers to Patient Self-Administration: None  Methods for Supporting Patient Self-Administration: N/A    Open Medication Therapy Problems  No medication therapy recommendations to display    Goals of Therapy   Goals Addressed Today    None         Reassessment Plan & Follow-Up  Medication Therapy Changes: Patient needing another syringe of Ovidrel 250mcg to assist in becoming pregnant.  Additional Plans, Therapy Recommendations, or Therapy Problems to Be Addressed: none   Pharmacist to perform regular reassessments no more than (6) months from the previous assessment.  Welcome information and patient satisfaction survey to be sent by retail team with patient's initial fill.  Care Coordinator to set up future refill outreaches, coordinate prescription delivery, and escalate clinical questions to pharmacist.     Attestation  I attest the patient was actively involved in and has agreed to the above plan of care. I attest that the initiated specialty medication(s) are appropriate for the patient based on my assessment. If the prescribed therapy is at any point deemed not appropriate based on the current or future assessments, a consultation will be initiated with the patient's specialty care provider to determine the best course of action. The revised plan of therapy will be documented along with any reassessments and/or additional patient education provided.     Electronically signed by Lainey Schulz PharmD, 08/23/24, 3:43 PM EDT.

## 2024-10-14 ENCOUNTER — SPECIALTY PHARMACY (OUTPATIENT)
Dept: PHARMACY | Facility: TELEHEALTH | Age: 34
End: 2024-10-14
Payer: COMMERCIAL

## 2024-10-14 NOTE — PROGRESS NOTES
Specialty Pharmacy Patient Management Program  Initial Assessment     Ilsa Bruno is a 33 y.o. female with Female infertility and enrolled in the Patient Management program offered by [facility]. An initial outreach was conducted, including assessment of therapy appropriateness and specialty medication education for Ovidrel 250 mcg. The patient was introduced to services offered by [facility], including: regular assessments, refill coordination, curbside pick-up or mail order delivery options, prior authorization maintenance, and financial assistance programs as applicable. The patient was also provided with contact information for the pharmacy team.     Insurance Coverage & Financial Support  [employer]      Relevant Past Medical History and Comorbidities  Relevant medical history and concomitant health conditions were discussed with the patient. The patient's chart has been reviewed for relevant past medical history and comorbid health conditions and updated as necessary.   Past Medical History:   Diagnosis Date    Abnormal Pap smear of cervix     Female infertility 2024    Herpes 10/2023    HPV (human papilloma virus) infection     PMS (premenstrual syndrome)     Period Sxs occur around time of cycle even if no bleeding. Changes in mood, bloating, cramping. Managable    Varicella Childhood     Social History     Socioeconomic History    Marital status:    Tobacco Use    Smoking status: Former     Current packs/day: 0.00     Average packs/day: 0.3 packs/day for 0.5 years (0.1 ttl pk-yrs)     Types: Cigarettes     Start date: 2020     Quit date: 2020     Years since quittin.5     Passive exposure: Never    Smokeless tobacco: Never   Vaping Use    Vaping status: Never Used   Substance and Sexual Activity    Alcohol use: Yes     Alcohol/week: 4.0 standard drinks of alcohol     Types: 4 Glasses of wine per week     Comment: DAILY,     Drug use:  Never    Sexual activity: Yes     Partners: Male     Birth control/protection: None     Problem list reviewed by Isa Martinez RPH on 10/14/2024 at  9:17 AM    Allergies  Known allergies and reactions were discussed with the patient. The patient's chart has been reviewed for allergy information and updated as necessary.   Patient has no known allergies.  Allergies reviewed by Isa Martinez RPH on 10/14/2024 at  9:17 AM    Current Medication List  This medication list has been reviewed with the patient and evaluated for any interactions or necessary modifications/recommendations, and updated to include all prescription medications, OTC medications, and supplements the patient is currently taking. This list reflects what is contained in the patient's profile, which has also been marked as reviewed to communicate to other providers it is the most up to date version of the patient's current medication therapy.     Current Outpatient Medications:     Choriogonadotropin Collins (Ovidrel) 250 MCG/0.5ML injection, Inject 0.5 mL under the skin into the appropriate area as directed, Disp: 0.5 mL, Rfl: 5    ciprofloxacin (CILOXAN) 0.3 % ophthalmic solution, , Disp: , Rfl:     prenatal vitamin (prenatal, CLASSIC, vitamin) tablet, Take  by mouth Daily., Disp: , Rfl:     valACYclovir (VALTREX) 1000 MG tablet, 1 tablet., Disp: , Rfl:   Medicines reviewed by Isa Martinez RPH on 10/14/2024 at  9:17 AM    Drug Interactions  none     Relevant Laboratory Values  Lab Results   Component Value Date    GLUCOSE 91 11/15/2023    CALCIUM 10.0 11/15/2023     11/15/2023    K 4.1 11/15/2023    CO2 24.0 11/15/2023     11/15/2023    BUN 13 11/15/2023    CREATININE 1.02 (H) 11/15/2023    BCR 12.7 11/15/2023    ANIONGAP 11.0 11/15/2023     Lab Results   Component Value Date    WBC 5.09 11/15/2023    HGB 15.2 11/15/2023    HCT 44.2 11/15/2023    MCV 87.5 11/15/2023     11/15/2023     Lab Value Review  The above lab values  have been reviewed; the following specialty medication(s) dose adjustment(s) are recommended: none.    Initial Education Provided for Specialty Medication  The patient has been provided with the following education and any applicable administration techniques (i.e. self-injection) have been demonstrated for the therapies indicated. All questions and concerns have been addressed prior to the patient receiving the medication, and the patient has verbalized understanding of the education and any materials provided. Additional patient education shall be provided and documented upon request by the patient, provider or payer.        Human chorionic gonadotropin (Pregnyl, Novarel, Ovidrel)    Medication Expectations   Why am I taking this medication? You are taking this medication because you are trying to become pregnant.   What should I expect while on this medication? Triggered ovulation 24-36 hours after shot administered   How does the medication work? As part of an assisted reproductive technology (ART) program, induces ovulation (trigger shot) in infertile females who have been pretreated with follicle stimulating hormones (FSH); induces ovulation and pregnancy in infertile females when the cause of infertility is functional.   How long will I be on this medication for? The amount of time you will be on this medication will be determined by your doctor and your response to the medication.    How do I take this medication? Take as directed on your prescription label. This medication is an injectable.  You may have to mix this medication yourself or you may receive a prefilled pen.   What are some possible side effects? Potential side effects include headache, drowsiness or dizziness, vaginal bleeding, injection site reactions, upper respiratory tract infections, stomach pain, nausea, acne, breast tenderness, headache, mood changes, such as irritability, restless feelings, or anger, pain, irritation, or inflammation  at site where injected.   What happens if I miss a dose? If you miss a dose, you must call your doctor immediately.  They will instruct you on what to do.     Medication Safety   What are things I should warn my doctor immediately about? Hypersensitivity reactions - trouble breathing or swallowing. If you show signs of ovarian hyperstimulation syndrome (severe pain in your lower stomach, nausea, vomiting, diarrhea, bloating, rapid weight gain, little or no urination, or trouble breathing). Serious pulmonary complications such as fever with shortness of breath or rapid breathing. Signs of a blood clot, sudden numbness or weakness, problems with vision or speech, swelling or redness in an arm or leg.   What are things that I should be cautious of? Hypersensitivity reactions and injection site reactions.   What are some medications that can interact with this one? There are no significant drug interactions.     Medication Storage/Handling   How should I handle this medication? Keep this medication out of reach of pets/children in original container.   How does this medication need to be stored? Prefilled syringe: Prior to dispensing, store at 2°C to 8°C (36°F to 46°F). Patient may store at 25°C (77°F) for up to 30 days. Protect from light.   How should I dispose of this medication? Throw away any unused medicine after the expiration date.     Resources/Support   How can I remind myself to take this medication? There are several reminder apps available.  There are also printable calendars online.  Your doctor may also provide you with a detailed schedule for you to see.   Is financial support available?  There are a wide variety of discount cards available if your medication is not approved by your insurance.    Which vaccines are recommended for me? Talk to your doctor about these vaccines: Flu, Coronavirus (COVID-19), Pneumococcal (pneumonia), Tdap, Hepatitis B.       Adherence, Self-Administration, and Current  Therapy Problems  Adherence related to the patient's specialty therapy was discussed with the patient. The Adherence segment of this outreach has been reviewed and updated.   Additional Barriers to Patient Self-Administration: none  Methods for Supporting Patient Self-Administration: n/a    Open Medication Therapy Problems  No medication therapy recommendations to display    Goals of Therapy   Goals Addressed Today        Specialty Pharmacy General Goal      Successful ovulation              Reassessment Plan & Follow-Up  Medication Therapy Changes: None  Additional Plans, Therapy Recommendations, or Therapy Problems to Be Addressed: none   Pharmacist to perform regular reassessments no more than (6) months from the previous assessment.  Welcome information and patient satisfaction survey to be sent by retail team with patient's initial fill.  Care Coordinator to set up future refill outreaches, coordinate prescription delivery, and escalate clinical questions to pharmacist.     Attestation  I attest the patient was actively involved in and has agreed to the above plan of care. I attest that the initiated specialty medication(s) are appropriate for the patient based on my assessment. If the prescribed therapy is at any point deemed not appropriate based on the current or future assessments, a consultation will be initiated with the patient's specialty care provider to determine the best course of action. The revised plan of therapy will be documented along with any reassessments and/or additional patient education provided.     Electronically signed by Isa Martinez RPH, 10/14/24, 9:18 AM EDT.

## 2024-11-12 ENCOUNTER — LAB (OUTPATIENT)
Dept: LAB | Facility: HOSPITAL | Age: 34
End: 2024-11-12
Payer: COMMERCIAL

## 2024-11-12 ENCOUNTER — TRANSCRIBE ORDERS (OUTPATIENT)
Dept: LAB | Facility: HOSPITAL | Age: 34
End: 2024-11-12
Payer: COMMERCIAL

## 2024-11-12 DIAGNOSIS — Z32.00 PREGNANCY EXAMINATION OR TEST, PREGNANCY UNCONFIRMED: ICD-10-CM

## 2024-11-12 DIAGNOSIS — Z32.00 PREGNANCY EXAMINATION OR TEST, PREGNANCY UNCONFIRMED: Primary | ICD-10-CM

## 2024-11-12 LAB — HCG INTACT+B SERPL-ACNC: 549.4 MIU/ML

## 2024-11-12 PROCEDURE — 84702 CHORIONIC GONADOTROPIN TEST: CPT

## 2024-11-12 PROCEDURE — 36415 COLL VENOUS BLD VENIPUNCTURE: CPT

## 2024-11-19 ENCOUNTER — OFFICE VISIT (OUTPATIENT)
Dept: FAMILY MEDICINE CLINIC | Facility: CLINIC | Age: 34
End: 2024-11-19
Payer: COMMERCIAL

## 2024-11-19 VITALS
HEART RATE: 96 BPM | BODY MASS INDEX: 27.42 KG/M2 | SYSTOLIC BLOOD PRESSURE: 110 MMHG | HEIGHT: 64 IN | DIASTOLIC BLOOD PRESSURE: 82 MMHG | WEIGHT: 160.6 LBS | OXYGEN SATURATION: 98 %

## 2024-11-19 DIAGNOSIS — Z00.00 ANNUAL PHYSICAL EXAM: Primary | ICD-10-CM

## 2024-11-19 DIAGNOSIS — Z3A.01 LESS THAN 8 WEEKS GESTATION OF PREGNANCY: ICD-10-CM

## 2024-11-19 PROCEDURE — 99395 PREV VISIT EST AGE 18-39: CPT | Performed by: STUDENT IN AN ORGANIZED HEALTH CARE EDUCATION/TRAINING PROGRAM

## 2024-11-19 NOTE — PROGRESS NOTES
Female Physical Note      Date: 2024   Patient Name: Ilsa Bruno  : 1990   MRN: [id number]     Chief Complaint:    Chief Complaint   Patient presents with    Annual Exam       History of Present Illness: Ilsa Bruno is a 33 y.o. female who is here today for their annual health maintenance and physical.    HPI  Doing well since last visit. Just found out she is ~5 weeks pregnant! Had been seeing fertility specialist. She and  are extremely excited.   No acute concerns today.   Sleeping well at night.  Diet is healthy, staying active.   Work stress is manageable.   Planning to get flu shot today.  She is UTD on COVID vaccination.      Subjective      Review of Systems:   Review of Systems   Constitutional:  Negative for activity change and appetite change.   Psychiatric/Behavioral:  Negative for depressed mood.        Past Medical History, Social History, Family History and Care Team were all reviewed with patient and updated as appropriate.     Medications:     Current Outpatient Medications:     prenatal vitamin (prenatal, CLASSIC, vitamin) tablet, Take  by mouth Daily., Disp: , Rfl:     valACYclovir (VALTREX) 1000 MG tablet, 1 tablet., Disp: , Rfl:     Allergies:   No Known Allergies    Immunizations:  Td/Tdap(Booster Q 10 yrs):  2022  Flu (Yearly):  Will get today  Immunization History   Administered Date(s) Administered    COVID-19 (MODERNA) 1st,2nd,3rd Dose Monovalent 2021, 2021    COVID-19 (UNSPECIFIED) 2024    PPD Test 2013, 2014, 2016, 2017    Tdap 10/30/2012, 2022     Pap:  UTD   Last Completed Pap Smear            PAP SMEAR (Every 3 Years) Next due on 2022  LIQUID-BASED PAP SMEAR, [facility] ([facility])                   Lipid panel:   Lab Results   Component Value Date    CHOL 168 11/15/2023    TRIG 59 11/15/2023    HDL 60 11/15/2023    LDL 96 11/15/2023       The ASCVD Risk score (Jake QUIROGA,  "et al., 2019) failed to calculate for the following reasons:    The 2019 ASCVD risk score is only valid for ages 40 to 79      Tobacco Use: Medium Risk (11/19/2024)    Patient History     Smoking Tobacco Use: Former     Smokeless Tobacco Use: Never     Passive Exposure: Never       Social History     Substance and Sexual Activity   Alcohol Use Not Currently    Alcohol/week: 4.0 standard drinks of alcohol    Comment: DAILY,         Social History     Substance and Sexual Activity   Drug Use Never        Diet/Physical activity: Healthy    Sexual Health: not using contraception, pregnant    PHQ-2 Depression Screening  Little interest or pleasure in doing things? Not at all   Feeling down, depressed, or hopeless? Not at all   PHQ-2 Total Score 0     PHQ-9 Total Score:      Objective     Physical Exam:  Vital Signs:   Vitals:    11/19/24 0807   BP: 110/82   BP Location: Left arm   Patient Position: Sitting   Cuff Size: Adult   Pulse: 96   SpO2: 98%   Weight: 72.8 kg (160 lb 9.6 oz)   Height: 162.6 cm (64\")     Body mass index is 27.57 kg/m².     Physical Exam  Constitutional:       Appearance: She is normal weight. She is not ill-appearing.   HENT:      Head: Normocephalic and atraumatic.      Right Ear: Tympanic membrane normal.      Left Ear: Tympanic membrane normal.      Mouth/Throat:      Mouth: Mucous membranes are moist.      Pharynx: Oropharynx is clear. No oropharyngeal exudate or posterior oropharyngeal erythema.   Eyes:      Extraocular Movements: Extraocular movements intact.      Conjunctiva/sclera: Conjunctivae normal.   Cardiovascular:      Rate and Rhythm: Normal rate and regular rhythm.      Heart sounds: Normal heart sounds.   Pulmonary:      Breath sounds: Normal breath sounds. No wheezing or rhonchi.   Abdominal:      General: Abdomen is flat.      Palpations: Abdomen is soft.      Tenderness: There is no abdominal tenderness.   Musculoskeletal:         General: Normal range of motion.      Cervical " back: Normal range of motion and neck supple.   Lymphadenopathy:      Cervical: No cervical adenopathy.   Neurological:      General: No focal deficit present.      Mental Status: She is alert.   Psychiatric:         Mood and Affect: Mood normal.         Thought Content: Thought content normal.             Assessment / Plan      Assessment/Plan:   Diagnoses and all orders for this visit:    1. Annual physical exam (Primary)    2. Less than 8 weeks gestation of pregnancy      Age appropriate screenings and recommendations reviewed  Fasting labs done 2023, deferred today  Cont healthy diet, regular physical activity  UTD on Tdap, COVID vaccinations. Will get flu shot today.  Taking PNV    Healthcare Maintenance:  Counseling provided based on age appropriate USPSTF guidelines. Preventive counseling and anticipatory guidance discussed on the following topics: nutrition, physical activity, healthy weight, family planning/contraception, mental health, and immunizations    BMI is >= 25 and <30. (Overweight) The following options were offered after discussion;: information on healthy weight added to patient's after visit summary     Ilsa Bruno voices understanding and acceptance of this advice and will call back with any further questions or concerns. AVS with preventive healthcare tips printed for patient.         Follow Up:   Return in about 1 year (around 11/19/2025).          Constance Mcintyre DO  [facility] [place] [address]

## 2024-12-26 ENCOUNTER — INITIAL PRENATAL (OUTPATIENT)
Dept: OBSTETRICS AND GYNECOLOGY | Facility: CLINIC | Age: 34
End: 2024-12-26
Payer: COMMERCIAL

## 2024-12-26 VITALS — BODY MASS INDEX: 27.67 KG/M2 | DIASTOLIC BLOOD PRESSURE: 70 MMHG | WEIGHT: 161.2 LBS | SYSTOLIC BLOOD PRESSURE: 112 MMHG

## 2024-12-26 DIAGNOSIS — O98.519 HERPES SIMPLEX TYPE 2 (HSV-2) INFECTION AFFECTING PREGNANCY, ANTEPARTUM: ICD-10-CM

## 2024-12-26 DIAGNOSIS — B00.9 HERPES SIMPLEX TYPE 2 (HSV-2) INFECTION AFFECTING PREGNANCY, ANTEPARTUM: ICD-10-CM

## 2024-12-26 DIAGNOSIS — Z3A.10 10 WEEKS GESTATION OF PREGNANCY: ICD-10-CM

## 2024-12-26 DIAGNOSIS — Z34.91 PRENATAL CARE IN FIRST TRIMESTER: Primary | ICD-10-CM

## 2024-12-26 PROBLEM — Z00.00 ANNUAL PHYSICAL EXAM: Status: RESOLVED | Noted: 2023-11-15 | Resolved: 2024-12-26

## 2024-12-26 PROBLEM — N97.9 FEMALE INFERTILITY: Status: RESOLVED | Noted: 2024-07-18 | Resolved: 2024-12-26

## 2024-12-26 PROBLEM — Z34.90 PREGNANCY: Status: ACTIVE | Noted: 2024-12-26

## 2024-12-26 NOTE — PROGRESS NOTES
Initial ob visit     CC- Here for care of pregnancy        Ilsa Bruno is a 34 y.o. female, , who presents for her first obstetrical visit.  No LMP recorded. Patient is pregnant.. Her MAGGIE is 2025, by Est. Date of Conception. Current GA is 10w3d.     Patient had IUI on 10/28/2024 with Dr. Sher.     Initial positive test date : 2024, HCG        Her periods are every 4 weeks.  Prior obstetric issues: none  Patient's past medical history is significant for:  Infertility .  Family history of genetic issues (includes FOB): Denies  Prior infections concerning in pregnancy (Rash, fever in last 2 weeks): No  Varicella Hx - history of chicken pox  Prior testing for Cystic Fibrosis Carrier or Sickle Cell Trait- Denies  Prepregnancy BMI - Body mass index is 27.67 kg/m².  History of STD: yes HSV  Hx of HSV for patient or partner: yes - patient and partner  Ultrasound Today: yes    OB History    Para Term  AB Living   1 0 0 0 0 0   SAB IAB Ectopic Molar Multiple Live Births   0 0 0 0 0 0      # Outcome Date GA Lbr Dain/2nd Weight Sex Type Anes PTL Lv   1 Current                Additional Pertinent History   Last Pap : 2022 Result: negative HPV: negative     Last Completed Pap Smear            PAP SMEAR (Every 3 Years) Next due on 2022  LIQUID-BASED PAP SMEAR, [facility] ([facility])                  History of abnormal Pap smear: yes - colpo several years ago  Family history of uterine, colon, breast, or ovarian cancer: no  Feelings of Anxiety or Depression: no  Tobacco Usage?: No   Alcohol/Drug Use?: NO  Over the age of 35 at delivery: no  Genetic Screening: desires cell free DNA  Flu Status: Already given in current flu season    PMH    Current Outpatient Medications:     prenatal vitamin (prenatal, CLASSIC, vitamin) tablet, Take  by mouth Daily., Disp: , Rfl:     valACYclovir (VALTREX) 1000 MG tablet, 1 tablet. (Patient not taking: Reported on 2024),  Disp: , Rfl:      Past Medical History:   Diagnosis Date    Abnormal Pap smear of cervix     Female infertility 07/18/2024    Herpes 10/2023    History of medical problems 2023    Needle stick in 2020, signs of Raynauds (no diagnosis) this year in fingers and toes    HPV (human papilloma virus) infection     PMS (premenstrual syndrome)     Period Sxs occur around time of cycle even if no bleeding. Changes in mood, bloating, cramping. Managable    Pregnancy 12/26/2024    IUI    Varicella Childhood        Past Surgical History:   Procedure Laterality Date    COLPOSCOPY      INTRAUTERINE DEVICE INSERTION         Review of Systems   Review of Systems    Patient Reports:  Nausea  Patient Denies:excessive nausea , excessive vomiting, and vaginal bleeding  All systems reviewed and otherwise normal.    I have reviewed and agree with the HPI, ROS, and historical information as entered above. Gracy Srivastava MD      /70   Wt 73.1 kg (161 lb 3.2 oz)   BMI 27.67 kg/m²     The additional following portions of the patient's history were reviewed and updated as appropriate: allergies, current medications, past family history, past medical history, past social history, past surgical history, and problem list.    Physical Exam  General:  well developed; well nourished  no acute distress  mentation appropriate   Chest/Respiratory: No labored breathing, normal respiratory effort, normal appearance, no respiratory noises noted   Heart:  not examined   Thyroid: not examined   Breasts:  Not performed.   Abdomen: Not performed.   Pelvis: Not performed.        Assessment and Plan    Problem List Items Addressed This Visit          Gynecologic and Obstetric Problems    Herpes simplex type 2 (HSV-2) infection affecting pregnancy, antepartum    Overview     Supp 36 wks         Relevant Medications    valACYclovir (VALTREX) 1000 MG tablet       Other    Pregnancy    Overview     IUI  NIPT          Other Visit Diagnoses       Prenatal  care in first trimester    -  Primary    Relevant Orders    Obstetric Panel    HIV-1 / O / 2 Ag / Antibody    Urine Culture - Urine, Urine, Clean Catch    Chlamydia trachomatis, Neisseria gonorrhoeae, PCR - Urine, Urine, Clean Catch    Urine Drug Screen - Urine, Clean Catch            Pregnancy at 10w3d  Reviewed routine prenatal care with the office and educational materials given  Lab(s) Ordered  Discussed options for genetic testing including first trimester nuchal translucency screen, genetic disease carrier testing, quadruple screen, and NIPT  Patient is on Prenatal vitamins  Activity recommendation : 150 minutes/week of moderate intensity aerobic activity unless we limit for bleeding, hypertension or other pregnancy complication   Return in about 1 month (around 1/26/2025) for F/U Prenatal.      Gracy Srivastava MD  12/26/2024

## 2024-12-27 LAB
ABO GROUP BLD: NORMAL
AMPHETAMINES UR QL SCN: NORMAL NG/ML
BARBITURATES UR QL SCN: NORMAL
BASOPHILS # BLD AUTO: 0 X10E3/UL (ref 0–0.2)
BASOPHILS NFR BLD AUTO: 1 %
BENZODIAZ UR QL SCN: NORMAL
BLD GP AB SCN SERPL QL: NEGATIVE
BZE UR QL SCN: NORMAL
CANNABINOIDS UR QL SCN: NORMAL
EOSINOPHIL # BLD AUTO: 0 X10E3/UL (ref 0–0.4)
EOSINOPHIL NFR BLD AUTO: 0 %
ERYTHROCYTE [DISTWIDTH] IN BLOOD BY AUTOMATED COUNT: 11.7 % (ref 11.7–15.4)
HBV SURFACE AG SERPL QL IA: NEGATIVE
HCT VFR BLD AUTO: 39 % (ref 34–46.6)
HCV IGG SERPL QL IA: NON REACTIVE
HGB BLD-MCNC: 13 G/DL (ref 11.1–15.9)
HIV 1+2 AB+HIV1 P24 AG SERPL QL IA: NON REACTIVE
IMM GRANULOCYTES # BLD AUTO: 0 X10E3/UL (ref 0–0.1)
IMM GRANULOCYTES NFR BLD AUTO: 0 %
LYMPHOCYTES # BLD AUTO: 1.6 X10E3/UL (ref 0.7–3.1)
LYMPHOCYTES NFR BLD AUTO: 23 %
MCH RBC QN AUTO: 29.7 PG (ref 26.6–33)
MCHC RBC AUTO-ENTMCNC: 33.3 G/DL (ref 31.5–35.7)
MCV RBC AUTO: 89 FL (ref 79–97)
METHADONE UR QL SCN: NORMAL
MONOCYTES # BLD AUTO: 0.5 X10E3/UL (ref 0.1–0.9)
MONOCYTES NFR BLD AUTO: 8 %
NEUTROPHILS # BLD AUTO: 4.7 X10E3/UL (ref 1.4–7)
NEUTROPHILS NFR BLD AUTO: 68 %
OPIATES UR QL SCN: NORMAL
OXYCODONE+OXYMORPHONE UR QL SCN: NORMAL
PCP UR QL: NORMAL
PLATELET # BLD AUTO: 309 X10E3/UL (ref 150–450)
PROPOXYPH UR QL SCN: NORMAL
RBC # BLD AUTO: 4.38 X10E6/UL (ref 3.77–5.28)
RH BLD: POSITIVE
RPR SER QL: NON REACTIVE
RUBV IGG SERPL IA-ACNC: 1.96 INDEX
WBC # BLD AUTO: 6.9 X10E3/UL (ref 3.4–10.8)

## 2024-12-28 LAB
BACTERIA UR CULT: NO GROWTH
BACTERIA UR CULT: NORMAL
C TRACH RRNA SPEC QL NAA+PROBE: NEGATIVE
N GONORRHOEA RRNA SPEC QL NAA+PROBE: NEGATIVE

## 2025-01-02 ENCOUNTER — TELEPHONE (OUTPATIENT)
Dept: OBSTETRICS AND GYNECOLOGY | Facility: CLINIC | Age: 35
End: 2025-01-02
Payer: COMMERCIAL

## 2025-01-23 ENCOUNTER — ROUTINE PRENATAL (OUTPATIENT)
Dept: OBSTETRICS AND GYNECOLOGY | Facility: CLINIC | Age: 35
End: 2025-01-23
Payer: COMMERCIAL

## 2025-01-23 VITALS — SYSTOLIC BLOOD PRESSURE: 122 MMHG | DIASTOLIC BLOOD PRESSURE: 80 MMHG | BODY MASS INDEX: 27.29 KG/M2 | WEIGHT: 159 LBS

## 2025-01-23 DIAGNOSIS — O98.519 HERPES SIMPLEX TYPE 2 (HSV-2) INFECTION AFFECTING PREGNANCY, ANTEPARTUM: ICD-10-CM

## 2025-01-23 DIAGNOSIS — Z3A.14 14 WEEKS GESTATION OF PREGNANCY: ICD-10-CM

## 2025-01-23 DIAGNOSIS — B00.9 HERPES SIMPLEX TYPE 2 (HSV-2) INFECTION AFFECTING PREGNANCY, ANTEPARTUM: ICD-10-CM

## 2025-01-23 DIAGNOSIS — Z34.92 PRENATAL CARE, SECOND TRIMESTER: Primary | ICD-10-CM

## 2025-01-23 LAB
EXPIRATION DATE: 0
GLUCOSE UR STRIP-MCNC: NEGATIVE MG/DL
Lab: 0
PROT UR STRIP-MCNC: NEGATIVE MG/DL

## 2025-01-23 NOTE — PROGRESS NOTES
OB FOLLOW UP  CC- Here for care of pregnancy        Ilsa Bruno is a 34 y.o.  14w5d patient being seen today for her obstetrical follow up visit. Patient reports mild constipation (resolves on its own with hydration) and mild occasional cramping.     Her prenatal care is complicated by (and status) :   Patient Active Problem List   Diagnosis    Herpes simplex type 2 (HSV-2) infection affecting pregnancy, antepartum    Pregnancy       Genetic testing?: already completed and was normal.  NOB labs reviewed; UDS not ran d/t not having enough urine. Collected today  Flu Status: Already given in current flu season  Ultrasound Today: No    ROS -   Patient Denies: leaking of fluid, vaginal bleeding, dysuria, excessive vomiting, and more than 6 contractions per hour  All other systems reviewed and are negative.     The additional following portions of the patient's history were reviewed and updated as appropriate: allergies, current medications, past family history, past medical history, past social history, past surgical history, and problem list.    I have reviewed and agree with the HPI, ROS, and historical information as entered above. Gracy Srivastava MD          /80   Wt 72.1 kg (159 lb)   BMI 27.29 kg/m²         EXAM:     Prenatal Vitals  BP: 122/80  Weight: 72.1 kg (159 lb)              Urine Glucose Read-only: Negative  Urine Protein Read-only: Negative       Assessment and Plan    Problem List Items Addressed This Visit          Gynecologic and Obstetric Problems    Herpes simplex type 2 (HSV-2) infection affecting pregnancy, antepartum    Overview     Supp 36 wks         Relevant Medications    valACYclovir (VALTREX) 1000 MG tablet       Other    Pregnancy    Overview     IUI  NIPT low risk         Relevant Orders    POC Glucose, Urine, Qualitative, Dipstick (Completed)    POC Protein, Urine, Qualitative, Dipstick (Completed)    Urine Drug Screen - Urine, Clean Catch     Other Visit  Diagnoses       Prenatal care, second trimester    -  Primary    Relevant Orders    POC Glucose, Urine, Qualitative, Dipstick (Completed)    POC Protein, Urine, Qualitative, Dipstick (Completed)    Urine Drug Screen - Urine, Clean Catch    US Ob 14 + Weeks Single or First Gestation            Pregnancy at 14w5d  Labs reviewed from New OB Visit.  Counseled on genetic testing, carrier status and option for NT screen- done and low risk.   Activity and Exercise discussed.  Patient is on Prenatal vitamins  Return in about 5 weeks (around 2/27/2025) for F/U Prenatal, U/S Next Visit.    Gracy Srivastava MD  01/23/2025

## 2025-01-24 LAB
AMPHETAMINES UR QL SCN: NEGATIVE NG/ML
BARBITURATES UR QL SCN: NEGATIVE NG/ML
BENZODIAZ UR QL SCN: NEGATIVE NG/ML
BZE UR QL SCN: NEGATIVE NG/ML
CANNABINOIDS UR QL SCN: NEGATIVE NG/ML
CREAT UR-MCNC: 139.4 MG/DL (ref 20–300)
LABORATORY COMMENT REPORT: NORMAL
METHADONE UR QL SCN: NEGATIVE NG/ML
OPIATES UR QL SCN: NEGATIVE NG/ML
OXYCODONE+OXYMORPHONE UR QL SCN: NEGATIVE NG/ML
PCP UR QL: NEGATIVE NG/ML
PH UR: 6 [PH] (ref 4.5–8.9)
PROPOXYPH UR QL SCN: NEGATIVE NG/ML

## 2025-03-06 ENCOUNTER — ROUTINE PRENATAL (OUTPATIENT)
Dept: OBSTETRICS AND GYNECOLOGY | Facility: CLINIC | Age: 35
End: 2025-03-06
Payer: COMMERCIAL

## 2025-03-06 VITALS — BODY MASS INDEX: 27.67 KG/M2 | WEIGHT: 161.2 LBS | DIASTOLIC BLOOD PRESSURE: 78 MMHG | SYSTOLIC BLOOD PRESSURE: 112 MMHG

## 2025-03-06 DIAGNOSIS — B00.9 HERPES SIMPLEX TYPE 2 (HSV-2) INFECTION AFFECTING PREGNANCY, ANTEPARTUM: ICD-10-CM

## 2025-03-06 DIAGNOSIS — Z3A.20 20 WEEKS GESTATION OF PREGNANCY: ICD-10-CM

## 2025-03-06 DIAGNOSIS — Z34.92 PRENATAL CARE, SECOND TRIMESTER: Primary | ICD-10-CM

## 2025-03-06 DIAGNOSIS — O98.519 HERPES SIMPLEX TYPE 2 (HSV-2) INFECTION AFFECTING PREGNANCY, ANTEPARTUM: ICD-10-CM

## 2025-03-06 LAB
GLUCOSE UR STRIP-MCNC: NEGATIVE MG/DL
PROT UR STRIP-MCNC: NEGATIVE MG/DL

## 2025-03-06 NOTE — PROGRESS NOTES
OB FOLLOW UP  CC- Here for care of pregnancy        Ilsa Bruno is a 34 y.o.  20w5d patient being seen today for her obstetrical follow up visit. Patient reports mild intermittent cramping that mostly occurs with position changes.     Her prenatal care is complicated by (and status) :   Patient Active Problem List   Diagnosis    Herpes simplex type 2 (HSV-2) infection affecting pregnancy, antepartum    Pregnancy       Flu Status: Already given in current flu season  Ultrasound Today: Yes anatomy scan  AFP was declined.    ROS -     Patient Denies: leaking of fluid, vaginal bleeding, dysuria, excessive vomiting, and more than 6 contractions per hour  Fetal Movement : absent  Other than what is documented in the HPI, all other systems reviewed and are negative.       The additional following portions of the patient's history were reviewed and updated as appropriate: allergies, current medications, past family history, past medical history, past social history, past surgical history, and problem list.      I have reviewed and agree with the HPI, ROS, and historical information as entered above. Gracy Srivastava MD      /78   Wt 73.1 kg (161 lb 3.2 oz)   BMI 27.67 kg/m²       EXAM:     Prenatal Vitals  BP: 112/78  Weight: 73.1 kg (161 lb 3.2 oz)   Fetal Heart Rate: 148          Urine Glucose Read-only: Negative  Urine Protein Read-only: Negative       Assessment and Plan    Problem List Items Addressed This Visit          Gynecologic and Obstetric Problems    Herpes simplex type 2 (HSV-2) infection affecting pregnancy, antepartum    Overview     Supp 36 wks         Relevant Medications    valACYclovir (VALTREX) 1000 MG tablet    Other Relevant Orders    US Ob Follow Up Transabdominal Approach       Other    Pregnancy    Overview     IUI  NIPT low risk          Other Visit Diagnoses       Prenatal care, second trimester    -  Primary    Relevant Orders    POC Urinalysis Dipstick (Completed)    US  Ob Follow Up Transabdominal Approach            Pregnancy at 20w5d  Anatomy scan today is incomplete, follow up in 4 weeks for additional views. Anatomy that was visualized was within normal limits.  Fetal status reassuring.   Activity and Exercise discussed.  Patient is on Prenatal vitamins  Return in about 1 month (around 4/6/2025) for F/U Prenatal, U/S Next Visit.      Gracy Srivastava MD  03/06/2025

## 2025-04-10 ENCOUNTER — ROUTINE PRENATAL (OUTPATIENT)
Dept: OBSTETRICS AND GYNECOLOGY | Facility: CLINIC | Age: 35
End: 2025-04-10
Payer: COMMERCIAL

## 2025-04-10 VITALS — DIASTOLIC BLOOD PRESSURE: 60 MMHG | BODY MASS INDEX: 28.84 KG/M2 | SYSTOLIC BLOOD PRESSURE: 110 MMHG | WEIGHT: 168 LBS

## 2025-04-10 DIAGNOSIS — Z3A.25 25 WEEKS GESTATION OF PREGNANCY: ICD-10-CM

## 2025-04-10 DIAGNOSIS — O98.519 HERPES SIMPLEX TYPE 2 (HSV-2) INFECTION AFFECTING PREGNANCY, ANTEPARTUM: ICD-10-CM

## 2025-04-10 DIAGNOSIS — B00.9 HERPES SIMPLEX TYPE 2 (HSV-2) INFECTION AFFECTING PREGNANCY, ANTEPARTUM: ICD-10-CM

## 2025-04-10 DIAGNOSIS — Z34.90 PRENATAL CARE, ANTEPARTUM, UNSPECIFIED GRAVIDITY: Primary | ICD-10-CM

## 2025-04-10 LAB
GLUCOSE UR STRIP-MCNC: NEGATIVE MG/DL
PROT UR STRIP-MCNC: NEGATIVE MG/DL

## 2025-04-10 NOTE — PROGRESS NOTES
"    OB FOLLOW UP  CC- Here for care of pregnancy        Ilsa Bruno is a 34 y.o.  25w5d patient being seen today for her obstetrical follow up visit. Patient reports {lexob pregnancy problems before 24 weeks:35714}.     Her prenatal care is complicated by (and status) : see below.  Patient Active Problem List   Diagnosis    Herpes simplex type 2 (HSV-2) infection affecting pregnancy, antepartum    Pregnancy     Ultrasound Today: Yes  Reviewed 1 hr glucose testing and TDAP next visit.    ROS -   Patient Denies: {lexob denies before 24 weeks:70888}  Fetal Movement : {desc; normal/decreased:24921}  Other than what is documented in the HPI, all other systems reviewed and are negative.       The additional following portions of the patient's history were reviewed and updated as appropriate: allergies, current medications, and problem list.      I have reviewed and agree with the HPI, ROS, and historical information as entered above. ***    There were no vitals taken for this visit.      EXAM:                                 Assessment and Plan    Problem List Items Addressed This Visit    None  Visit Diagnoses         Prenatal care, antepartum, unspecified     -  Primary    Relevant Orders    POC Urinalysis Dipstick            Pregnancy at 25w5d  Fetal status reassuring.  {follow up anatomy:63281}  {28wk labs [facility] (Optional):55967::\"1 hour gtt\",\"CBC\",\"Antibody screen\",\"TDAP\",\"RPR\"} next visit. Instructions given  {pregnancy plan 24w lexob:43635::\"Discussed/encouraged TDAP vaccination after 28 weeks\"}  Activity and Exercise discussed.  No follow-ups on file.      Anjelica Garcia RN  04/10/2025    "

## 2025-04-10 NOTE — PROGRESS NOTES
OB FOLLOW UP  CC- Here for care of pregnancy        Ilsa Bruno is a 34 y.o.  25w5d patient being seen today for her obstetrical follow up visit. Patient reports no complaints.     Her prenatal care is complicated by (and status) : see below.  Patient Active Problem List   Diagnosis    Herpes simplex type 2 (HSV-2) infection affecting pregnancy, antepartum    Pregnancy       Flu Status: Already given in current flu season  Ultrasound Today: Yes. All anatomy visualized today and appears normal. EFW 74%, AC 94%  Reviewed 1 hr glucose testing and TDAP next visit.    ROS -   Patient Denies: leaking of fluid, vaginal bleeding, dysuria, excessive vomiting, and more than 6 contractions per hour  Fetal Movement : normal  Other than what is documented in the HPI, all other systems reviewed and are negative.       The additional following portions of the patient's history were reviewed and updated as appropriate: allergies, current medications, past medical history, past social history, past surgical history, and problem list.      I have reviewed and agree with the HPI, ROS, and historical information as entered above. Norma Cadena, APRN      /60   Wt 76.2 kg (168 lb)   BMI 28.84 kg/m²       EXAM:     Prenatal Vitals  BP: 110/60  Weight: 76.2 kg (168 lb)   Fetal Heart Rate: 141               Urine Glucose Read-only: Negative  Urine Protein Read-only: Negative       Assessment and Plan    Problem List Items Addressed This Visit       Herpes simplex type 2 (HSV-2) infection affecting pregnancy, antepartum    Overview   Supp 36 wks         Relevant Medications    valACYclovir (VALTREX) 1000 MG tablet    Pregnancy    Overview   IUI  NIPT low risk          Other Visit Diagnoses         Prenatal care, antepartum, unspecified     -  Primary    Relevant Orders    POC Urinalysis Dipstick (Completed)            Pregnancy at 25w5d  Fetal status reassuring.  anatomy scan completed today and  within normal limits.  1 hour gtt, CBC, Antibody screen, TDAP, and RPR next visit. Instructions given  Fetal movement/PTL or Labor precautions  Reviewed routine prenatal care with the office and educational materials given  Discussed/encouraged TDAP vaccination after 28 weeks  Activity and Exercise discussed.  Return in about 3 weeks (around 5/1/2025) for 1 hour patricia.      Norma Cadena, APRN  04/10/2025

## 2025-05-06 ENCOUNTER — ROUTINE PRENATAL (OUTPATIENT)
Dept: OBSTETRICS AND GYNECOLOGY | Facility: CLINIC | Age: 35
End: 2025-05-06
Payer: COMMERCIAL

## 2025-05-06 VITALS — SYSTOLIC BLOOD PRESSURE: 122 MMHG | WEIGHT: 172.2 LBS | DIASTOLIC BLOOD PRESSURE: 70 MMHG | BODY MASS INDEX: 29.56 KG/M2

## 2025-05-06 DIAGNOSIS — Z3A.29 29 WEEKS GESTATION OF PREGNANCY: ICD-10-CM

## 2025-05-06 DIAGNOSIS — O98.519 HERPES SIMPLEX TYPE 2 (HSV-2) INFECTION AFFECTING PREGNANCY, ANTEPARTUM: ICD-10-CM

## 2025-05-06 DIAGNOSIS — B00.9 HERPES SIMPLEX TYPE 2 (HSV-2) INFECTION AFFECTING PREGNANCY, ANTEPARTUM: ICD-10-CM

## 2025-05-06 DIAGNOSIS — Z34.93 PRENATAL CARE IN THIRD TRIMESTER, UNSPECIFIED GRAVIDITY: Primary | ICD-10-CM

## 2025-05-06 LAB
GLUCOSE UR STRIP-MCNC: NEGATIVE MG/DL
PROT UR STRIP-MCNC: NEGATIVE MG/DL

## 2025-05-06 NOTE — PROGRESS NOTES
OB FOLLOW UP  CC- Here for care of pregnancy        Ilsa Bruno is a 34 y.o.  29w3d patient being seen today for her obstetrical follow up. Patient reports no complaints.     Patient undergoing Glucola testing today. She is due for her testing at 2:55.       MBT: A+  28 week packet: reviewed with patient , counseled on fetal movement , pediatrician list reviewed, breast pump discussed, and childbirth classes reviewed  TDAP: will receive at work or pharmacy  Ultrasound Today: No  Does patient need tubal consent or  consent signed? no    Her prenatal care is complicated by (and status) : see below.  Patient Active Problem List   Diagnosis    Herpes simplex type 2 (HSV-2) infection affecting pregnancy, antepartum    Pregnancy         ROS -   Patient Denies: Loss of Fluid, Vaginal Spotting, Vision Changes, Headaches, Contractions, Epigastric pain, and skin itching  Fetal Movement : normal  Other than what is documented in the HPI, all other systems reviewed and are negative.     The additional following portions of the patient's history were reviewed and updated as appropriate: allergies, current medications, past family history, past medical history, past social history, past surgical history, and problem list.    I have reviewed and agree with the HPI, ROS, and historical information as entered above. Gracy Srivastava MD      /70   Wt 78.1 kg (172 lb 3.2 oz)   BMI 29.56 kg/m²         EXAM:     Prenatal Vitals  BP: 122/70  Weight: 78.1 kg (172 lb 3.2 oz)   Fetal Heart Rate: +      Fundal Height (cm): 29 cm        Urine Glucose Read-only: Negative  Urine Protein Read-only: Negative         Assessment and Plan    Problem List Items Addressed This Visit          Gynecologic and Obstetric Problems    Herpes simplex type 2 (HSV-2) infection affecting pregnancy, antepartum    Overview   Supp 36 wks         Relevant Medications    valACYclovir (VALTREX) 1000 MG tablet    Other Relevant Orders     US Ob Follow Up Transabdominal Approach       Other    Pregnancy    Overview   IUI  NIPT low risk          Other Visit Diagnoses         Prenatal care in third trimester, unspecified     -  Primary    Relevant Orders    POC Urinalysis Dipstick (Completed)    CBC (No Diff)    Gestational Screen 1 Hr ([facility])    Antibody Screen    RPR, Rfx Qn RPR / Confirm TP    US Ob Follow Up Transabdominal Approach            Pregnancy at 29w3d  1 hr Glucola, CBC, RPR. Antibody screen  Fetal movement/PTL or Labor precautions  U/S ordered at follow up  Activity and Exercise discussed.  Return in about 3 weeks (around 2025) for F/U Prenatal, U/S Next Visit.        Gracy Srivastava MD  2025

## 2025-05-07 LAB
BLD GP AB SCN SERPL QL: NEGATIVE
ERYTHROCYTE [DISTWIDTH] IN BLOOD BY AUTOMATED COUNT: 12.3 % (ref 12.3–15.4)
GLUCOSE 1H P 50 G GLC PO SERPL-MCNC: 110 MG/DL (ref 65–139)
HCT VFR BLD AUTO: 36.3 % (ref 34–46.6)
HGB BLD-MCNC: 12.6 G/DL (ref 12–15.9)
MCH RBC QN AUTO: 30.5 PG (ref 26.6–33)
MCHC RBC AUTO-ENTMCNC: 34.7 G/DL (ref 31.5–35.7)
MCV RBC AUTO: 87.9 FL (ref 79–97)
PLATELET # BLD AUTO: 270 10*3/MM3 (ref 140–450)
RBC # BLD AUTO: 4.13 10*6/MM3 (ref 3.77–5.28)
RPR SER QL: NON REACTIVE
WBC # BLD AUTO: 9.48 10*3/MM3 (ref 3.4–10.8)

## 2025-05-18 ENCOUNTER — PATIENT MESSAGE (OUTPATIENT)
Dept: OBSTETRICS AND GYNECOLOGY | Facility: CLINIC | Age: 35
End: 2025-05-18
Payer: COMMERCIAL

## 2025-05-19 RX ORDER — HYDROCORTISONE 25 MG/G
CREAM TOPICAL
Qty: 30 G | Refills: 1 | Status: SHIPPED | OUTPATIENT
Start: 2025-05-19

## 2025-06-03 ENCOUNTER — ROUTINE PRENATAL (OUTPATIENT)
Dept: OBSTETRICS AND GYNECOLOGY | Facility: CLINIC | Age: 35
End: 2025-06-03
Payer: COMMERCIAL

## 2025-06-03 VITALS — BODY MASS INDEX: 30.35 KG/M2 | SYSTOLIC BLOOD PRESSURE: 128 MMHG | WEIGHT: 176.8 LBS | DIASTOLIC BLOOD PRESSURE: 84 MMHG

## 2025-06-03 DIAGNOSIS — B00.9 HERPES SIMPLEX TYPE 2 (HSV-2) INFECTION AFFECTING PREGNANCY, ANTEPARTUM: ICD-10-CM

## 2025-06-03 DIAGNOSIS — O98.519 HERPES SIMPLEX TYPE 2 (HSV-2) INFECTION AFFECTING PREGNANCY, ANTEPARTUM: ICD-10-CM

## 2025-06-03 DIAGNOSIS — Z34.93 PRENATAL CARE IN THIRD TRIMESTER, UNSPECIFIED GRAVIDITY: ICD-10-CM

## 2025-06-03 DIAGNOSIS — Z3A.33 33 WEEKS GESTATION OF PREGNANCY: Primary | ICD-10-CM

## 2025-06-03 LAB
GLUCOSE UR STRIP-MCNC: NEGATIVE MG/DL
PROT UR STRIP-MCNC: NEGATIVE MG/DL

## 2025-06-03 NOTE — PROGRESS NOTES
OB FOLLOW UP  CC- Here for care of pregnancy        Ilsa Bruno is a 34 y.o.  33w3d patient being seen today for her obstetrical follow up visit. Patient reports itching in arms, abdomen, breast, and scalp for the past few weeks. She denies starting new detergent or soaps.     Her prenatal care is complicated by (and status) : see below.  Patient Active Problem List   Diagnosis    Herpes simplex type 2 (HSV-2) infection affecting pregnancy, antepartum    Pregnancy     US done today: Yes.  Findings showed efw 98%ile.  I have personally evaluated the U/S and agree with the findings. Gracy Srivastava MD    Non Stress Test: No.    ROS -   Patient Denies: Loss of Fluid, Vaginal Spotting, Vision Changes, Headaches, Contractions, and Epigastric pain  Fetal Movement : normal  Other than what is documented in the HPI, all other systems reviewed and are negative.       The additional following portions of the patient's history were reviewed and updated as appropriate: allergies, current medications, past family history, past medical history, past social history, past surgical history, and problem list.    I have reviewed and agree with the HPI, ROS, and historical information as entered above. Gracy Srivastava MD      /84   Wt 80.2 kg (176 lb 12.8 oz)   BMI 30.35 kg/m²       EXAM:     Prenatal Vitals  BP: 128/84  Weight: 80.2 kg (176 lb 12.8 oz)   Fetal Heart Rate: 142               Urine Glucose Read-only: Negative  Urine Protein Read-only: Negative           Assessment and Plan    Problem List Items Addressed This Visit          Gynecologic and Obstetric Problems    Herpes simplex type 2 (HSV-2) infection affecting pregnancy, antepartum    Overview   Supp 36 wks         Relevant Medications    valACYclovir (VALTREX) 1000 MG tablet       Other    Pregnancy - Primary    Overview   IUI  NIPT low risk  EFW 98%ile, AC >99%ile 33 wks. Repeat growth 37 wks.           Other Visit Diagnoses         Prenatal  care in third trimester, unspecified         Relevant Orders    POC Urinalysis Dipstick (Completed)            Pregnancy at 33w3d. US today EFW 98%ile with AC >99%ile. Glucola was 110. Discussed watching sugar, carbs. Repeat growth 37 wks.   Fetal status reassuring.   Activity and Exercise discussed.  Fetal movement/PTL or Labor precautions  Return in about 2 weeks (around 2025) for F/U Prenatal.    Gracy Srivastava MD  2025

## 2025-06-11 ENCOUNTER — TELEPHONE (OUTPATIENT)
Dept: OBSTETRICS AND GYNECOLOGY | Facility: CLINIC | Age: 35
End: 2025-06-11
Payer: COMMERCIAL

## 2025-06-11 NOTE — TELEPHONE ENCOUNTER
Called patient.  She states BP = 132/90; repeated in a few minutes = 120/77.  Denies any bleeding, leaking fluid, or contractions.   Reviewed s/s of preeclampsia.   Discussed that she can monitor BP for a few days and call if >140/90.  Advised to monitor and call for recurrent or other concerning symptoms.   Patient v/u and agreed.

## 2025-06-11 NOTE — TELEPHONE ENCOUNTER
Patient of Dr. Srivastava; G1 @ 34w 4d. LOV 06/03/25; NOV 06/17/25.  Returned patient's call.   States once last night and once this morning she noted sparkles/specks in her vision. Both were with position change; only lasted a few seconds.  Denies any H/A or edema.  States she did have a tight stomach most of the evening last night.   States she had constant tight and cramping pain in upper abdomen most of the evening; was in center and on both sides; not in the same spot all the time; also had a sharp pain in upper abdomen that lasted about 10 minutes.   States pain resolved last night; denies any since.   Denies any constipation or diarrhea.   Reports active fetal movement.   Patient is just arriving at work. Requested she rest for a few minutes and check her BP. I will call her back for result. She v/u and agreed.

## 2025-06-17 ENCOUNTER — ROUTINE PRENATAL (OUTPATIENT)
Dept: OBSTETRICS AND GYNECOLOGY | Facility: CLINIC | Age: 35
End: 2025-06-17
Payer: COMMERCIAL

## 2025-06-17 VITALS — BODY MASS INDEX: 31.76 KG/M2 | DIASTOLIC BLOOD PRESSURE: 80 MMHG | WEIGHT: 185 LBS | SYSTOLIC BLOOD PRESSURE: 124 MMHG

## 2025-06-17 DIAGNOSIS — B00.9 HERPES SIMPLEX TYPE 2 (HSV-2) INFECTION AFFECTING PREGNANCY, ANTEPARTUM: ICD-10-CM

## 2025-06-17 DIAGNOSIS — O98.519 HERPES SIMPLEX TYPE 2 (HSV-2) INFECTION AFFECTING PREGNANCY, ANTEPARTUM: ICD-10-CM

## 2025-06-17 DIAGNOSIS — Z3A.35 35 WEEKS GESTATION OF PREGNANCY: ICD-10-CM

## 2025-06-17 DIAGNOSIS — O36.60X0 EXCESSIVE FETAL GROWTH AFFECTING MANAGEMENT OF PREGNANCY, ANTEPARTUM, SINGLE OR UNSPECIFIED FETUS: ICD-10-CM

## 2025-06-17 DIAGNOSIS — Z34.03 PRENATAL CARE, FIRST PREGNANCY IN THIRD TRIMESTER: Primary | ICD-10-CM

## 2025-06-17 LAB
EXPIRATION DATE: NORMAL
GLUCOSE UR STRIP-MCNC: NEGATIVE MG/DL
Lab: NORMAL
PROT UR STRIP-MCNC: NEGATIVE MG/DL

## 2025-06-17 NOTE — PROGRESS NOTES
OB FOLLOW UP  CC- Here for care of pregnancy        Ilsa Bruno is a 34 y.o.  35w3d patient being seen today for her obstetrical follow up visit. Patient reports mild swelling in her hands and face and irregular mia boss and contractions.    She denies LOF, vaginal spotting, headaches, vision changes.  She reports adequate fetal movements, >10 movements in 10 hours.       Her prenatal care is complicated by (and status) :   Patient Active Problem List   Diagnosis    Herpes simplex type 2 (HSV-2) infection affecting pregnancy, antepartum    Pregnancy     Ultrasound Today: No  Non Stress Test: No.    ROS -   Patient Denies: Loss of Fluid, Vaginal Spotting, Vision Changes, Headaches, Nausea , Vomiting , Epigastric pain, and skin itching  Fetal Movement : normal  Other than what is documented in the HPI, all other systems reviewed and are negative.       The additional following portions of the patient's history were reviewed and updated as appropriate: allergies, current medications, past family history, past medical history, past social history, past surgical history, and problem list.    I have reviewed and agree with the HPI, ROS, and historical information as entered above. Katelyn De La O, APRN      /80   Wt 83.9 kg (185 lb)   BMI 31.76 kg/m²       EXAM:     Prenatal Vitals  BP: 124/80  Weight: 83.9 kg (185 lb)   Fetal Heart Rate: 135               Urine Glucose Read-only: Negative  Urine Protein Read-only: Negative           Assessment and Plan    Problem List Items Addressed This Visit          Gravid and     Pregnancy    Overview   IUI  NIPT low risk  EFW 98%ile, AC >99%ile 33 wks. Repeat growth 37 wks.             Other    Herpes simplex type 2 (HSV-2) infection affecting pregnancy, antepartum    Overview   Supp 36 wks         Relevant Medications    valACYclovir (VALTREX) 1000 MG tablet     Other Visit Diagnoses         Prenatal care, first pregnancy in third  trimester    -  Primary    Relevant Orders    POC Protein, Urine, Qualitative, Dipstick (Completed)    POC Glucose, Urine, Qualitative, Dipstick (Completed)      Excessive fetal growth affecting management of pregnancy, antepartum, single or unspecified fetus        Relevant Orders    US Ob Follow Up Transabdominal Approach            Pregnancy at 35w3d  Fetal status reassuring.   Activity and Exercise discussed.  Fetal movement/PTL or Labor precautions  U/S ordered at follow up  Patient is on Prenatal vitamins  Reviewed Pre-eclampsia signs/symptoms  GBS next visit  Follow up in one week with ultrasound to check growth fetal size LGA    Katelyn De La O, APRN  06/17/2025

## 2025-06-19 ENCOUNTER — TELEPHONE (OUTPATIENT)
Dept: OBSTETRICS AND GYNECOLOGY | Facility: CLINIC | Age: 35
End: 2025-06-19
Payer: COMMERCIAL

## 2025-06-19 NOTE — TELEPHONE ENCOUNTER
PT CALLED AND STATED THAT SHE IS LATE TERM PREGNANCY AND SHE HAS BEEN EXPERIENCING HEADACHES SINCE LAST NIGHT ITS AROUND HER EYE ON THE SIDE OF HER HEAD LIKE A SINUS HEADACHE, SHE STATES SHE TOOK TYLENOL AND IT WENT DOWN SOME IN PAIN BUT IT HAS GOTTEN WORSE

## 2025-06-19 NOTE — TELEPHONE ENCOUNTER
Patient states she woke up with a headache and took her bp 125/97, 129/91, 129/86. Denies cp, soa, blurry vision, ruq pain, bleeding, leaking, no contractions. Took 500 tylenol this am with some relief. Encouraged patient to increase hydration, drink one bottle of caffeine beverage, take 1000 tylenol every 6 hours as needed, try Flonase for congestion, cool mist humidifier tonight for sleep and monitor bps. If elevated call us back or if headache is not relieved. VU

## 2025-06-24 ENCOUNTER — ROUTINE PRENATAL (OUTPATIENT)
Dept: OBSTETRICS AND GYNECOLOGY | Facility: CLINIC | Age: 35
End: 2025-06-24
Payer: COMMERCIAL

## 2025-06-24 ENCOUNTER — LAB (OUTPATIENT)
Dept: LAB | Facility: HOSPITAL | Age: 35
End: 2025-06-24
Payer: COMMERCIAL

## 2025-06-24 VITALS — BODY MASS INDEX: 31.55 KG/M2 | WEIGHT: 183.8 LBS | DIASTOLIC BLOOD PRESSURE: 82 MMHG | SYSTOLIC BLOOD PRESSURE: 132 MMHG

## 2025-06-24 DIAGNOSIS — O36.60X0 EXCESSIVE FETAL GROWTH AFFECTING MANAGEMENT OF PREGNANCY, ANTEPARTUM, SINGLE OR UNSPECIFIED FETUS: ICD-10-CM

## 2025-06-24 DIAGNOSIS — Z3A.36 36 WEEKS GESTATION OF PREGNANCY: ICD-10-CM

## 2025-06-24 DIAGNOSIS — Z34.03 PRENATAL CARE, FIRST PREGNANCY IN THIRD TRIMESTER: ICD-10-CM

## 2025-06-24 DIAGNOSIS — B00.9 HERPES SIMPLEX TYPE 2 (HSV-2) INFECTION AFFECTING PREGNANCY, ANTEPARTUM: ICD-10-CM

## 2025-06-24 DIAGNOSIS — O98.519 HERPES SIMPLEX TYPE 2 (HSV-2) INFECTION AFFECTING PREGNANCY, ANTEPARTUM: ICD-10-CM

## 2025-06-24 DIAGNOSIS — Z34.03 PRENATAL CARE, FIRST PREGNANCY IN THIRD TRIMESTER: Primary | ICD-10-CM

## 2025-06-24 LAB
GLUCOSE UR STRIP-MCNC: NEGATIVE MG/DL
PROT UR STRIP-MCNC: NEGATIVE MG/DL

## 2025-06-24 PROCEDURE — 87081 CULTURE SCREEN ONLY: CPT

## 2025-06-24 RX ORDER — VALACYCLOVIR HYDROCHLORIDE 500 MG/1
500 TABLET, FILM COATED ORAL 2 TIMES DAILY
Qty: 60 TABLET | Refills: 0 | Status: SHIPPED | OUTPATIENT
Start: 2025-06-24 | End: 2025-07-24

## 2025-06-24 NOTE — PROGRESS NOTES
OB FOLLOW UP  CC- Here for care of pregnancy        Ilsa Bruno is a 34 y.o.  36w3d patient being seen today for her obstetrical follow up visit. Patient reports irregular cramping with FM and bilateral hip and back pain. Patient also reports having a moderate headache last Thursday that did not resolve with Tylenol or rest. Patient states that she also had congestion. Patient states that her BP averaged 120s/80-89. Patient denies HA since then.    Her prenatal care is complicated by (and status) : see below.  Patient Active Problem List   Diagnosis    Herpes simplex type 2 (HSV-2) infection affecting pregnancy, antepartum    Pregnancy       GBS Status: Done Today. She is not allergic to PCN.    No Known Allergies     Her Delivery Plan is: Discuss IOL at 39 weeks.    US today: Yes. FHR: 139bpm. Cephalic. DEBORAH: 15.0CM. HC: 62%. AC: >99%. EFW: 99%- 8lb 5oz.   Non Stress Test: No    ROS -   Patient Denies: Loss of Fluid, Vaginal Spotting, Vision Changes, Nausea , Vomiting , Epigastric pain, and skin itching  Fetal Movement : normal  Other than what is documented in the HPI, all other systems reviewed and are negative.       The additional following portions of the patient's history were reviewed and updated as appropriate: allergies, current medications, and problem list.    I have reviewed and agree with the HPI, ROS, and historical information as entered above. Gracy Srivastava MD        EXAM:     Prenatal Vitals  BP: 132/82  Weight: 83.4 kg (183 lb 12.8 oz)   Fetal Heart Rate: 139/US              Urine Glucose Read-only: Negative  Urine Protein Read-only: Negative           Assessment and Plan    Problem List Items Addressed This Visit          Gynecologic and Obstetric Problems    Herpes simplex type 2 (HSV-2) infection affecting pregnancy, antepartum    Overview   Supp 36 wks         Relevant Medications    valACYclovir (VALTREX) 1000 MG tablet    valACYclovir (Valtrex) 500 MG tablet    Other  Relevant Orders    POC Urinalysis Dipstick (Completed)       Other    Pregnancy    Overview   IUI  NIPT low risk  EFW 98%ile, AC >99%ile 33 wks. Repeat growth 36wks 99%ile, AC >99%ile.    Sched 1 c/s for macrosomia         Relevant Orders    Group B Streptococcus Culture - Swab, Vaginal/Rectum     Other Visit Diagnoses         Prenatal care, first pregnancy in third trimester    -  Primary    Relevant Orders    POC Urinalysis Dipstick (Completed)    Group B Streptococcus Culture - Swab, Vaginal/Rectum      Excessive fetal growth affecting management of pregnancy, antepartum, single or unspecified fetus        Relevant Orders    POC Urinalysis Dipstick (Completed)    Group B Streptococcus Culture - Swab, Vaginal/Rectum            Pregnancy at 36w3d  Fetal status reassuring.   Reviewed Pre-eclampsia signs/symptoms  Discussed options for delivery of suspected macrosomia with patient. Pt. elects for outright c/s due to suspected macro.  Delivery options reviewed with patient  Signs of labor reviewed  Kick counts reviewed  Activity and Exercise discussed.  Return in about 1 week (around 7/1/2025).    Gracy Srivastava MD  06/24/2025

## 2025-06-27 LAB — BACTERIA SPEC AEROBE CULT: NORMAL

## 2025-07-01 ENCOUNTER — ROUTINE PRENATAL (OUTPATIENT)
Dept: OBSTETRICS AND GYNECOLOGY | Facility: CLINIC | Age: 35
End: 2025-07-01
Payer: COMMERCIAL

## 2025-07-01 VITALS — DIASTOLIC BLOOD PRESSURE: 84 MMHG | WEIGHT: 187.6 LBS | SYSTOLIC BLOOD PRESSURE: 122 MMHG | BODY MASS INDEX: 32.2 KG/M2

## 2025-07-01 DIAGNOSIS — Z3A.37 37 WEEKS GESTATION OF PREGNANCY: ICD-10-CM

## 2025-07-01 DIAGNOSIS — Z34.03 FIRST PREGNANCY, THIRD TRIMESTER: Primary | ICD-10-CM

## 2025-07-01 LAB
GLUCOSE UR STRIP-MCNC: NEGATIVE MG/DL
PROT UR STRIP-MCNC: NEGATIVE MG/DL

## 2025-07-01 NOTE — PROGRESS NOTES
OB FOLLOW UP  CC- Here for care of pregnancy        Ilsa Bruno is a 34 y.o.  37w3d patient being seen today for her obstetrical follow up visit. Patient reports low back pain, itching on legs, arms, belly, and scalp..     Her prenatal care is complicated by (and status) : see below.  Patient Active Problem List   Diagnosis    Herpes simplex type 2 (HSV-2) infection affecting pregnancy, antepartum    Pregnancy       GBS Status:   Group B Strep Culture   Date Value Ref Range Status   2025 No Group B Streptococcus isolated  Final         No Known Allergies     TDAP done at pharmacy on 25  Flu Status: Already given in current flu season  Her Delivery Plan is: Primary . Scheduled  07/15/25  US today: no  Non Stress Test: No.    ROS -   Patient Denies: Loss of Fluid, Vaginal Spotting, Vision Changes, Headaches, Nausea , Vomiting , Contractions, and Epigastric pain  Fetal Movement : normal  Other than what is documented in the HPI, all other systems reviewed and are negative.       The additional following portions of the patient's history were reviewed and updated as appropriate: allergies, current medications, past family history, past medical history, past social history, past surgical history, and problem list.    I have reviewed and agree with the HPI, ROS, and historical information as entered above. Gracy Srivastava MD        EXAM:     Prenatal Vitals  BP: 122/84  Weight: 85.1 kg (187 lb 9.6 oz)   Fetal Heart Rate: +              Urine Glucose Read-only: Negative  Urine Protein Read-only: Negative           Assessment and Plan    Problem List Items Addressed This Visit       Pregnancy    Overview   IUI  NIPT low risk  EFW 98%ile, AC >99%ile 33 wks. Repeat growth 36wks 99%ile, AC >99%ile.    Sched 1 c/s for macrosomia          Other Visit Diagnoses         First pregnancy, third trimester    -  Primary    Relevant Orders    POC Urinalysis Dipstick (Completed)            Pregnancy  at 37w3d  Fetal status reassuring.   Reviewed Pre-eclampsia signs/symptoms  Delivery options reviewed with patient  Signs of labor reviewed  Kick counts reviewed  Activity and Exercise discussed.  Return in about 1 week (around 7/8/2025) for F/U Prenatal.    Gracy Srivastava MD  07/01/2025

## 2025-07-08 ENCOUNTER — PREP FOR SURGERY (OUTPATIENT)
Dept: OTHER | Facility: HOSPITAL | Age: 35
End: 2025-07-08
Payer: COMMERCIAL

## 2025-07-08 ENCOUNTER — ROUTINE PRENATAL (OUTPATIENT)
Dept: OBSTETRICS AND GYNECOLOGY | Facility: CLINIC | Age: 35
End: 2025-07-08
Payer: COMMERCIAL

## 2025-07-08 VITALS — BODY MASS INDEX: 32.44 KG/M2 | SYSTOLIC BLOOD PRESSURE: 128 MMHG | DIASTOLIC BLOOD PRESSURE: 74 MMHG | WEIGHT: 189 LBS

## 2025-07-08 DIAGNOSIS — Z3A.39 39 WEEKS GESTATION OF PREGNANCY: Primary | ICD-10-CM

## 2025-07-08 DIAGNOSIS — Z3A.38 38 WEEKS GESTATION OF PREGNANCY: ICD-10-CM

## 2025-07-08 DIAGNOSIS — Z34.93 THIRD TRIMESTER PREGNANCY: Primary | ICD-10-CM

## 2025-07-08 LAB
GLUCOSE UR STRIP-MCNC: NEGATIVE MG/DL
PROT UR STRIP-MCNC: NEGATIVE MG/DL

## 2025-07-08 RX ORDER — BUPIVACAINE HCL/0.9 % NACL/PF 0.1 %
2 PLASTIC BAG, INJECTION (ML) EPIDURAL ONCE
Status: CANCELLED | OUTPATIENT
Start: 2025-07-08 | End: 2025-07-08

## 2025-07-08 RX ORDER — METHYLERGONOVINE MALEATE 0.2 MG/ML
200 INJECTION INTRAVENOUS ONCE AS NEEDED
Status: CANCELLED | OUTPATIENT
Start: 2025-07-08

## 2025-07-08 RX ORDER — KETOROLAC TROMETHAMINE 30 MG/ML
30 INJECTION, SOLUTION INTRAMUSCULAR; INTRAVENOUS ONCE
Status: CANCELLED | OUTPATIENT
Start: 2025-07-08 | End: 2025-07-08

## 2025-07-08 RX ORDER — CITRIC ACID/SODIUM CITRATE 334-500MG
30 SOLUTION, ORAL ORAL ONCE
Status: CANCELLED | OUTPATIENT
Start: 2025-07-08 | End: 2025-07-08

## 2025-07-08 RX ORDER — OXYTOCIN/0.9 % SODIUM CHLORIDE 30/500 ML
85 PLASTIC BAG, INJECTION (ML) INTRAVENOUS ONCE
Status: CANCELLED | OUTPATIENT
Start: 2025-07-08 | End: 2025-07-08

## 2025-07-08 RX ORDER — OXYTOCIN/0.9 % SODIUM CHLORIDE 30/500 ML
650 PLASTIC BAG, INJECTION (ML) INTRAVENOUS ONCE
Status: CANCELLED | OUTPATIENT
Start: 2025-07-08 | End: 2025-07-08

## 2025-07-08 RX ORDER — CARBOPROST TROMETHAMINE 250 UG/ML
250 INJECTION, SOLUTION INTRAMUSCULAR AS NEEDED
Status: CANCELLED | OUTPATIENT
Start: 2025-07-08

## 2025-07-08 RX ORDER — SODIUM CHLORIDE 9 MG/ML
40 INJECTION, SOLUTION INTRAVENOUS AS NEEDED
Status: CANCELLED | OUTPATIENT
Start: 2025-07-08

## 2025-07-08 RX ORDER — MISOPROSTOL 200 UG/1
800 TABLET ORAL AS NEEDED
Status: CANCELLED | OUTPATIENT
Start: 2025-07-08

## 2025-07-08 RX ORDER — SODIUM CHLORIDE 0.9 % (FLUSH) 0.9 %
10 SYRINGE (ML) INJECTION AS NEEDED
Status: CANCELLED | OUTPATIENT
Start: 2025-07-08

## 2025-07-08 RX ORDER — ACETAMINOPHEN 500 MG
1000 TABLET ORAL ONCE
Status: CANCELLED | OUTPATIENT
Start: 2025-07-08 | End: 2025-07-08

## 2025-07-08 RX ORDER — LIDOCAINE HYDROCHLORIDE 10 MG/ML
0.5 INJECTION, SOLUTION EPIDURAL; INFILTRATION; INTRACAUDAL; PERINEURAL ONCE AS NEEDED
Status: CANCELLED | OUTPATIENT
Start: 2025-07-08

## 2025-07-08 RX ORDER — SODIUM CHLORIDE 0.9 % (FLUSH) 0.9 %
10 SYRINGE (ML) INJECTION EVERY 12 HOURS SCHEDULED
Status: CANCELLED | OUTPATIENT
Start: 2025-07-08

## 2025-07-08 NOTE — PROGRESS NOTES
OB FOLLOW UP  CC- Here for care of pregnancy        Ilsa Bruno is a 34 y.o.  38w3d patient being seen today for her obstetrical follow up visit. Patient reports no complaints. GBS NEG. Pt feels well, good fm, occ BH ctx.    Her prenatal care is complicated by (and status) : see below.  Patient Active Problem List   Diagnosis    Herpes simplex type 2 (HSV-2) infection affecting pregnancy, antepartum    Pregnancy       GBS Status: negative  Group B Strep Culture   Date Value Ref Range Status   2025 No Group B Streptococcus isolated  Final         No Known Allergies       Flu Status: Already given in current flu season  Her Delivery Plan is: Primary . Scheduled    US today: no  Non Stress Test: No.    ROS -   Patient Denies: Loss of Fluid, Vaginal Spotting, Vision Changes, Headaches, Nausea , Vomiting , Epigastric pain, and skin itching  Fetal Movement : normal  Other than what is documented in the HPI, all other systems reviewed and are negative.       The additional following portions of the patient's history were reviewed and updated as appropriate: allergies.    I have reviewed and agree with the HPI, ROS, and historical information as entered above. Gracy Srivastava MD        EXAM:     Prenatal Vitals  BP: 128/74  Weight: 85.7 kg (189 lb)   Fetal Heart Rate: +              Urine Glucose Read-only: Negative  Urine Protein Read-only: Negative           Assessment and Plan    Problem List Items Addressed This Visit       Pregnancy    Overview   IUI  NIPT low risk  EFW 98%ile, AC >99%ile 33 wks. Repeat growth 36wks 99%ile, AC >99%ile.    Sched 1 c/s for macrosomia          Other Visit Diagnoses         Third trimester pregnancy    -  Primary    Relevant Orders    POC Urinalysis Dipstick (Completed)            Pregnancy at 38w3d  Fetal status reassuring.   Reviewed Pre-eclampsia signs/symptoms  Reviewed upcoming c/s and PAT instructions with patient. Arrival time and NPO status reviewed.    Delivery options reviewed with patient  Signs of labor reviewed  Kick counts reviewed  Activity and Exercise discussed.  No follow-ups on file.    Gracy Srivastava MD  07/08/2025

## 2025-07-11 ENCOUNTER — HOSPITAL ENCOUNTER (INPATIENT)
Facility: HOSPITAL | Age: 35
LOS: 3 days | Discharge: HOME | End: 2025-07-15
Attending: OBSTETRICS & GYNECOLOGY | Admitting: OBSTETRICS & GYNECOLOGY
Payer: COMMERCIAL

## 2025-07-11 ENCOUNTER — TELEPHONE (OUTPATIENT)
Dept: OBSTETRICS AND GYNECOLOGY | Facility: CLINIC | Age: 35
End: 2025-07-11
Payer: COMMERCIAL

## 2025-07-11 ENCOUNTER — ROUTINE PRENATAL (OUTPATIENT)
Dept: OBSTETRICS AND GYNECOLOGY | Facility: CLINIC | Age: 35
End: 2025-07-11
Payer: COMMERCIAL

## 2025-07-11 VITALS — SYSTOLIC BLOOD PRESSURE: 130 MMHG | DIASTOLIC BLOOD PRESSURE: 80 MMHG | WEIGHT: 187 LBS | BODY MASS INDEX: 32.1 KG/M2

## 2025-07-11 DIAGNOSIS — Z3A.38 38 WEEKS GESTATION OF PREGNANCY: ICD-10-CM

## 2025-07-11 DIAGNOSIS — B00.9 HERPES SIMPLEX TYPE 2 (HSV-2) INFECTION AFFECTING PREGNANCY, ANTEPARTUM: ICD-10-CM

## 2025-07-11 DIAGNOSIS — O98.519 HERPES SIMPLEX TYPE 2 (HSV-2) INFECTION AFFECTING PREGNANCY, ANTEPARTUM: ICD-10-CM

## 2025-07-11 DIAGNOSIS — Z98.891 STATUS POST CESAREAN SECTION: Primary | ICD-10-CM

## 2025-07-11 DIAGNOSIS — Z34.93 PRENATAL CARE IN THIRD TRIMESTER, UNSPECIFIED GRAVIDITY: Primary | ICD-10-CM

## 2025-07-11 LAB
DEPRECATED RDW RBC AUTO: 44.5 FL (ref 37–54)
ERYTHROCYTE [DISTWIDTH] IN BLOOD BY AUTOMATED COUNT: 14.3 % (ref 12.3–15.4)
EXPIRATION DATE: ABNORMAL
GLUCOSE UR STRIP-MCNC: NEGATIVE MG/DL
HCT VFR BLD AUTO: 39.8 % (ref 34–46.6)
HGB BLD-MCNC: 13.4 G/DL (ref 12–15.9)
Lab: ABNORMAL
MCH RBC QN AUTO: 29.2 PG (ref 26.6–33)
MCHC RBC AUTO-ENTMCNC: 33.7 G/DL (ref 31.5–35.7)
MCV RBC AUTO: 86.7 FL (ref 79–97)
PLATELET # BLD AUTO: 147 10*3/MM3 (ref 140–450)
PMV BLD AUTO: 11 FL (ref 6–12)
PROT UR STRIP-MCNC: ABNORMAL MG/DL
PROT UR STRIP-MCNC: ABNORMAL MG/DL
RBC # BLD AUTO: 4.59 10*6/MM3 (ref 3.77–5.28)
WBC NRBC COR # BLD AUTO: 8.23 10*3/MM3 (ref 3.4–10.8)

## 2025-07-11 PROCEDURE — 81002 URINALYSIS NONAUTO W/O SCOPE: CPT | Performed by: OBSTETRICS & GYNECOLOGY

## 2025-07-11 PROCEDURE — 82247 BILIRUBIN TOTAL: CPT | Performed by: OBSTETRICS & GYNECOLOGY

## 2025-07-11 PROCEDURE — 83615 LACTATE (LD) (LDH) ENZYME: CPT | Performed by: OBSTETRICS & GYNECOLOGY

## 2025-07-11 PROCEDURE — 59025 FETAL NON-STRESS TEST: CPT | Performed by: OBSTETRICS & GYNECOLOGY

## 2025-07-11 PROCEDURE — 84075 ASSAY ALKALINE PHOSPHATASE: CPT | Performed by: OBSTETRICS & GYNECOLOGY

## 2025-07-11 PROCEDURE — 86850 RBC ANTIBODY SCREEN: CPT | Performed by: OBSTETRICS & GYNECOLOGY

## 2025-07-11 PROCEDURE — 86780 TREPONEMA PALLIDUM: CPT | Performed by: OBSTETRICS & GYNECOLOGY

## 2025-07-11 PROCEDURE — 86901 BLOOD TYPING SEROLOGIC RH(D): CPT

## 2025-07-11 PROCEDURE — 84550 ASSAY OF BLOOD/URIC ACID: CPT | Performed by: OBSTETRICS & GYNECOLOGY

## 2025-07-11 PROCEDURE — 84450 TRANSFERASE (AST) (SGOT): CPT | Performed by: OBSTETRICS & GYNECOLOGY

## 2025-07-11 PROCEDURE — 99223 1ST HOSP IP/OBS HIGH 75: CPT | Performed by: OBSTETRICS & GYNECOLOGY

## 2025-07-11 PROCEDURE — 59025 FETAL NON-STRESS TEST: CPT

## 2025-07-11 PROCEDURE — 86901 BLOOD TYPING SEROLOGIC RH(D): CPT | Performed by: OBSTETRICS & GYNECOLOGY

## 2025-07-11 PROCEDURE — 84460 ALANINE AMINO (ALT) (SGPT): CPT | Performed by: OBSTETRICS & GYNECOLOGY

## 2025-07-11 PROCEDURE — 82565 ASSAY OF CREATININE: CPT | Performed by: OBSTETRICS & GYNECOLOGY

## 2025-07-11 PROCEDURE — 86900 BLOOD TYPING SEROLOGIC ABO: CPT

## 2025-07-11 PROCEDURE — 86900 BLOOD TYPING SEROLOGIC ABO: CPT | Performed by: OBSTETRICS & GYNECOLOGY

## 2025-07-11 PROCEDURE — 85027 COMPLETE CBC AUTOMATED: CPT | Performed by: OBSTETRICS & GYNECOLOGY

## 2025-07-11 RX ORDER — SODIUM CHLORIDE 0.9 % (FLUSH) 0.9 %
1-10 SYRINGE (ML) INJECTION AS NEEDED
Status: DISCONTINUED | OUTPATIENT
Start: 2025-07-11 | End: 2025-07-15 | Stop reason: HOSPADM

## 2025-07-11 RX ORDER — VALACYCLOVIR HYDROCHLORIDE 500 MG/1
500 TABLET, FILM COATED ORAL 2 TIMES DAILY
Status: DISCONTINUED | OUTPATIENT
Start: 2025-07-12 | End: 2025-07-12

## 2025-07-11 RX ORDER — SODIUM CHLORIDE 9 MG/ML
40 INJECTION, SOLUTION INTRAVENOUS AS NEEDED
Status: DISCONTINUED | OUTPATIENT
Start: 2025-07-11 | End: 2025-07-15 | Stop reason: HOSPADM

## 2025-07-11 RX ORDER — SODIUM CHLORIDE 0.9 % (FLUSH) 0.9 %
10 SYRINGE (ML) INJECTION EVERY 12 HOURS SCHEDULED
Status: DISCONTINUED | OUTPATIENT
Start: 2025-07-12 | End: 2025-07-15 | Stop reason: HOSPADM

## 2025-07-11 NOTE — TELEPHONE ENCOUNTER
38w6d  [place]    Pt states she had a HA that started around 10pm last night and went away with tylenol until 1 am. She did not take more but it would not go away until 5am. She states she took more tylenol at 8 am and has had caffeine but the HA has only went away a little. She reports BP at 10am was 145/105 and repeat at 10:15 was 156/104. She denies vision changes but reports some R sided midline discomfort that improved with sitting. I spoke with KS and she recommends pt come in for BP check and NST. Pt VU

## 2025-07-11 NOTE — PROGRESS NOTES
CC- Headaches with increase BP       Ilsa Bruno is a 34 y.o.  38w6d patient being seen today for headache that is now improving and elevated bp at home    She reports she started having a headache last night, she took 500mg of Tylenol at 8:30pm with no relief. She took another 500mg of Tylenol this morning with little relief, BP this morning 150/100.     Her prenatal care is complicated by (and status) :    Patient Active Problem List   Diagnosis    Herpes simplex type 2 (HSV-2) infection affecting pregnancy, antepartum    Pregnancy    Macrosomia         Ultrasound Today: No.  Reason for test: increase BP   Date of Test: 2025  Time frame of test: 20  RN NST Interpretation:   15x15 reactive no contractions    ROS -   Patient Reports : Headaches  Patient Denies: Loss of Fluid, Vaginal Spotting, Vision Changes, Nausea , Vomiting , Contractions, Epigastric pain, and skin itching  Fetal Movement : normal  All other systems reviewed and are negative.       The additional following portions of the patient's history were reviewed and updated as appropriate: allergies and current medications.    I have reviewed and agree with the HPI, ROS, and historical information as entered above. Katelyn De La O, APRN      /80   Wt 84.8 kg (187 lb)   BMI 32.10 kg/m²       EXAM:     Prenatal Vitals  BP: 130/80  Weight: 84.8 kg (187 lb)   Fetal Heart Rate: NST                Assessment and Plan    Problem List Items Addressed This Visit          Gravid and     Pregnancy    Overview   IUI  NIPT low risk  EFW 98%ile, AC >99%ile 33 wks. Repeat growth 36wks 99%ile, AC >99%ile.    Sched 1 c/s for macrosomia            Symptoms and Signs    Macrosomia       Other    Herpes simplex type 2 (HSV-2) infection affecting pregnancy, antepartum    Overview   Supp 36 wks         Relevant Medications    valACYclovir (VALTREX) 1000 MG tablet    valACYclovir (Valtrex) 500 MG tablet     Other Visit Diagnoses          Prenatal care in third trimester, unspecified     -  Primary    Relevant Orders    POC Urinalysis Dipstick (Completed)    Preeclampsia Panel            Pregnancy at [id number] in for headache/elevated bp @ work  Fetal status reassuring. 20 min NST 15 x 15 reactive, Cat 1, No contractions or decelerations noted.   Headache that is now improving, proteinuria, bp wnl in office--stat pep  NST reactive today.  PEP labs done  PIH precautions reviewed  Labor precautions reviewed.  Off work, bedrest Reviewed Pre-eclampsia signs/symptoms-Headache not relieved by tylenol or rest, chest pain, shortness of breath, right upper quadrant pain, blurry vision. Monitor blood pressure at home call provider if above 140/90 consistently.     Follow up Tuesday with scheduled C/S.    Katelyn De La O, APRN  2025

## 2025-07-11 NOTE — TELEPHONE ENCOUNTER
PT is calling in at 38 and 6 days - [facility]  -  she has calling with a headache that is not relieved by medication - she also has had high BP readings - at 10am it was  145/105 then waited about 10 mins and took it again and that reading was 156/104.

## 2025-07-12 ENCOUNTER — ANESTHESIA EVENT (OUTPATIENT)
Dept: LABOR AND DELIVERY | Facility: HOSPITAL | Age: 35
End: 2025-07-12
Payer: COMMERCIAL

## 2025-07-12 ENCOUNTER — ANESTHESIA (OUTPATIENT)
Dept: LABOR AND DELIVERY | Facility: HOSPITAL | Age: 35
End: 2025-07-12
Payer: COMMERCIAL

## 2025-07-12 LAB
ABO GROUP BLD: NORMAL
ABO GROUP BLD: NORMAL
ALP SERPL-CCNC: 229 U/L (ref 39–117)
ALT SERPL W P-5'-P-CCNC: 23 U/L (ref 1–33)
AST SERPL-CCNC: 34 U/L (ref 1–32)
BILIRUB SERPL-MCNC: 0.2 MG/DL (ref 0–1.2)
BLD GP AB SCN SERPL QL: NEGATIVE
CREAT SERPL-MCNC: 1.1 MG/DL (ref 0.57–1)
LDH SERPL-CCNC: 284 U/L (ref 135–214)
RH BLD: POSITIVE
RH BLD: POSITIVE
T&S EXPIRATION DATE: NORMAL
TREPONEMA PALLIDUM IGG+IGM AB [PRESENCE] IN SERUM OR PLASMA BY IMMUNOASSAY: NORMAL
URATE SERPL-MCNC: 7.5 MG/DL (ref 2.4–5.7)

## 2025-07-12 PROCEDURE — 25010000002 ONDANSETRON PER 1 MG: Performed by: ANESTHESIOLOGY

## 2025-07-12 PROCEDURE — 25010000002 MIDAZOLAM PER 1 MG: Performed by: ANESTHESIOLOGY

## 2025-07-12 PROCEDURE — 25010000002 CEFAZOLIN PER 500 MG: Performed by: OBSTETRICS & GYNECOLOGY

## 2025-07-12 PROCEDURE — 25810000003 LACTATED RINGERS PER 1000 ML: Performed by: OBSTETRICS & GYNECOLOGY

## 2025-07-12 PROCEDURE — 63710000001 PROMETHAZINE PER 25 MG: Performed by: OBSTETRICS & GYNECOLOGY

## 2025-07-12 PROCEDURE — 25010000002 FENTANYL CITRATE (PF) 100 MCG/2ML SOLUTION: Performed by: ANESTHESIOLOGY

## 2025-07-12 PROCEDURE — 25810000003 LACTATED RINGERS PER 1000 ML: Performed by: ANESTHESIOLOGY

## 2025-07-12 PROCEDURE — 51702 INSERT TEMP BLADDER CATH: CPT

## 2025-07-12 PROCEDURE — 59025 FETAL NON-STRESS TEST: CPT

## 2025-07-12 PROCEDURE — 25010000002 FAMOTIDINE (PF) 20 MG/2ML SOLUTION: Performed by: ANESTHESIOLOGY

## 2025-07-12 PROCEDURE — 25010000002 KETOROLAC TROMETHAMINE PER 15 MG: Performed by: OBSTETRICS & GYNECOLOGY

## 2025-07-12 PROCEDURE — 25010000002 MORPHINE PER 10 MG: Performed by: ANESTHESIOLOGY

## 2025-07-12 PROCEDURE — 25010000002 BUPIVACAINE IN DEXTROSE 0.75-8.25 % SOLUTION: Performed by: ANESTHESIOLOGY

## 2025-07-12 PROCEDURE — 59510 CESAREAN DELIVERY: CPT | Performed by: OBSTETRICS & GYNECOLOGY

## 2025-07-12 PROCEDURE — 25010000002 METOCLOPRAMIDE PER 10 MG: Performed by: ANESTHESIOLOGY

## 2025-07-12 PROCEDURE — 25010000002 MAGNESIUM SULFATE 20 GM/500ML SOLUTION: Performed by: OBSTETRICS & GYNECOLOGY

## 2025-07-12 PROCEDURE — 25010000002 OXYTOCIN PER 10 UNITS: Performed by: ANESTHESIOLOGY

## 2025-07-12 PROCEDURE — 94799 UNLISTED PULMONARY SVC/PX: CPT

## 2025-07-12 PROCEDURE — 25010000002 ONDANSETRON PER 1 MG: Performed by: OBSTETRICS & GYNECOLOGY

## 2025-07-12 DEVICE — SEAL HEMO SURG ARISTA/AH ABS/PWDR 3GM: Type: IMPLANTABLE DEVICE | Site: UTERUS | Status: FUNCTIONAL

## 2025-07-12 RX ORDER — KETOROLAC TROMETHAMINE 30 MG/ML
30 INJECTION, SOLUTION INTRAMUSCULAR; INTRAVENOUS ONCE
Status: DISCONTINUED | OUTPATIENT
Start: 2025-07-12 | End: 2025-07-12

## 2025-07-12 RX ORDER — MAGNESIUM SULFATE HEPTAHYDRATE 40 MG/ML
1 INJECTION, SOLUTION INTRAVENOUS CONTINUOUS
Status: DISCONTINUED | OUTPATIENT
Start: 2025-07-12 | End: 2025-07-12

## 2025-07-12 RX ORDER — PROMETHAZINE HYDROCHLORIDE 25 MG/1
25 TABLET ORAL ONCE AS NEEDED
Status: COMPLETED | OUTPATIENT
Start: 2025-07-12 | End: 2025-07-12

## 2025-07-12 RX ORDER — SIMETHICONE 80 MG
80 TABLET,CHEWABLE ORAL 4 TIMES DAILY PRN
Status: DISCONTINUED | OUTPATIENT
Start: 2025-07-12 | End: 2025-07-15 | Stop reason: HOSPADM

## 2025-07-12 RX ORDER — IBUPROFEN 600 MG/1
600 TABLET, FILM COATED ORAL EVERY 6 HOURS
Status: DISCONTINUED | OUTPATIENT
Start: 2025-07-13 | End: 2025-07-15 | Stop reason: HOSPADM

## 2025-07-12 RX ORDER — ONDANSETRON 2 MG/ML
4 INJECTION INTRAMUSCULAR; INTRAVENOUS EVERY 4 HOURS PRN
Status: DISCONTINUED | OUTPATIENT
Start: 2025-07-12 | End: 2025-07-15 | Stop reason: HOSPADM

## 2025-07-12 RX ORDER — PRENATAL VIT/IRON FUM/FOLIC AC 27MG-0.8MG
1 TABLET ORAL DAILY
Status: DISCONTINUED | OUTPATIENT
Start: 2025-07-12 | End: 2025-07-15 | Stop reason: HOSPADM

## 2025-07-12 RX ORDER — MISOPROSTOL 200 UG/1
800 TABLET ORAL AS NEEDED
Status: DISCONTINUED | OUTPATIENT
Start: 2025-07-12 | End: 2025-07-15 | Stop reason: HOSPADM

## 2025-07-12 RX ORDER — KETOROLAC TROMETHAMINE 30 MG/ML
30 INJECTION, SOLUTION INTRAMUSCULAR; INTRAVENOUS ONCE
Status: COMPLETED | OUTPATIENT
Start: 2025-07-12 | End: 2025-07-12

## 2025-07-12 RX ORDER — CALCIUM GLUCONATE 98 MG/ML
1 INJECTION, SOLUTION INTRAVENOUS AS NEEDED
Status: ACTIVE | OUTPATIENT
Start: 2025-07-12 | End: 2025-07-14

## 2025-07-12 RX ORDER — ALUMINA, MAGNESIA, AND SIMETHICONE 2400; 2400; 240 MG/30ML; MG/30ML; MG/30ML
15 SUSPENSION ORAL EVERY 4 HOURS PRN
Status: DISCONTINUED | OUTPATIENT
Start: 2025-07-12 | End: 2025-07-15 | Stop reason: HOSPADM

## 2025-07-12 RX ORDER — CALCIUM CARBONATE 500 MG/1
1 TABLET, CHEWABLE ORAL EVERY 4 HOURS PRN
Status: DISCONTINUED | OUTPATIENT
Start: 2025-07-12 | End: 2025-07-15 | Stop reason: HOSPADM

## 2025-07-12 RX ORDER — PROMETHAZINE HYDROCHLORIDE 12.5 MG/1
12.5 SUPPOSITORY RECTAL ONCE AS NEEDED
Status: COMPLETED | OUTPATIENT
Start: 2025-07-12 | End: 2025-07-12

## 2025-07-12 RX ORDER — ONDANSETRON 2 MG/ML
4 INJECTION INTRAMUSCULAR; INTRAVENOUS EVERY 6 HOURS PRN
Status: DISCONTINUED | OUTPATIENT
Start: 2025-07-12 | End: 2025-07-15 | Stop reason: HOSPADM

## 2025-07-12 RX ORDER — HYDROCORTISONE 25 MG/G
1 CREAM TOPICAL AS NEEDED
Status: DISCONTINUED | OUTPATIENT
Start: 2025-07-12 | End: 2025-07-15 | Stop reason: HOSPADM

## 2025-07-12 RX ORDER — ACETAMINOPHEN 325 MG/1
650 TABLET ORAL EVERY 6 HOURS
Status: DISCONTINUED | OUTPATIENT
Start: 2025-07-13 | End: 2025-07-15 | Stop reason: HOSPADM

## 2025-07-12 RX ORDER — METOCLOPRAMIDE HYDROCHLORIDE 5 MG/ML
INJECTION INTRAMUSCULAR; INTRAVENOUS AS NEEDED
Status: DISCONTINUED | OUTPATIENT
Start: 2025-07-12 | End: 2025-07-12 | Stop reason: SURG

## 2025-07-12 RX ORDER — FAMOTIDINE 10 MG/ML
INJECTION, SOLUTION INTRAVENOUS AS NEEDED
Status: DISCONTINUED | OUTPATIENT
Start: 2025-07-12 | End: 2025-07-12 | Stop reason: SURG

## 2025-07-12 RX ORDER — OXYTOCIN/0.9 % SODIUM CHLORIDE 30/500 ML
650 PLASTIC BAG, INJECTION (ML) INTRAVENOUS ONCE
Status: DISCONTINUED | OUTPATIENT
Start: 2025-07-12 | End: 2025-07-15 | Stop reason: HOSPADM

## 2025-07-12 RX ORDER — LABETALOL 200 MG/1
200 TABLET, FILM COATED ORAL EVERY 8 HOURS SCHEDULED
Status: DISCONTINUED | OUTPATIENT
Start: 2025-07-12 | End: 2025-07-15 | Stop reason: HOSPADM

## 2025-07-12 RX ORDER — SODIUM CHLORIDE, SODIUM LACTATE, POTASSIUM CHLORIDE, CALCIUM CHLORIDE 600; 310; 30; 20 MG/100ML; MG/100ML; MG/100ML; MG/100ML
INJECTION, SOLUTION INTRAVENOUS CONTINUOUS PRN
Status: DISCONTINUED | OUTPATIENT
Start: 2025-07-12 | End: 2025-07-12 | Stop reason: SURG

## 2025-07-12 RX ORDER — OXYTOCIN 10 [USP'U]/ML
INJECTION, SOLUTION INTRAMUSCULAR; INTRAVENOUS AS NEEDED
Status: DISCONTINUED | OUTPATIENT
Start: 2025-07-12 | End: 2025-07-12 | Stop reason: SURG

## 2025-07-12 RX ORDER — MIDAZOLAM HYDROCHLORIDE 1 MG/ML
INJECTION, SOLUTION INTRAMUSCULAR; INTRAVENOUS AS NEEDED
Status: DISCONTINUED | OUTPATIENT
Start: 2025-07-12 | End: 2025-07-12 | Stop reason: SURG

## 2025-07-12 RX ORDER — NIFEDIPINE 30 MG/1
30 TABLET, EXTENDED RELEASE ORAL 2 TIMES DAILY
Status: DISCONTINUED | OUTPATIENT
Start: 2025-07-12 | End: 2025-07-15 | Stop reason: HOSPADM

## 2025-07-12 RX ORDER — METHYLERGONOVINE MALEATE 0.2 MG/ML
200 INJECTION INTRAVENOUS ONCE AS NEEDED
Status: DISCONTINUED | OUTPATIENT
Start: 2025-07-12 | End: 2025-07-15 | Stop reason: HOSPADM

## 2025-07-12 RX ORDER — SODIUM CHLORIDE, SODIUM LACTATE, POTASSIUM CHLORIDE, CALCIUM CHLORIDE 600; 310; 30; 20 MG/100ML; MG/100ML; MG/100ML; MG/100ML
75 INJECTION, SOLUTION INTRAVENOUS CONTINUOUS
Status: ACTIVE | OUTPATIENT
Start: 2025-07-12 | End: 2025-07-13

## 2025-07-12 RX ORDER — KETOROLAC TROMETHAMINE 15 MG/ML
15 INJECTION, SOLUTION INTRAMUSCULAR; INTRAVENOUS EVERY 6 HOURS
Status: COMPLETED | OUTPATIENT
Start: 2025-07-12 | End: 2025-07-13

## 2025-07-12 RX ORDER — CITRIC ACID/SODIUM CITRATE 334-500MG
30 SOLUTION, ORAL ORAL ONCE
Status: COMPLETED | OUTPATIENT
Start: 2025-07-12 | End: 2025-07-12

## 2025-07-12 RX ORDER — METOCLOPRAMIDE 10 MG/1
10 TABLET ORAL ONCE AS NEEDED
Status: DISCONTINUED | OUTPATIENT
Start: 2025-07-12 | End: 2025-07-15 | Stop reason: HOSPADM

## 2025-07-12 RX ORDER — OXYCODONE HYDROCHLORIDE 5 MG/1
5 TABLET ORAL EVERY 4 HOURS PRN
Status: DISCONTINUED | OUTPATIENT
Start: 2025-07-12 | End: 2025-07-15 | Stop reason: HOSPADM

## 2025-07-12 RX ORDER — NIFEDIPINE 10 MG/1
10 CAPSULE ORAL ONCE
Status: DISCONTINUED | OUTPATIENT
Start: 2025-07-12 | End: 2025-07-13

## 2025-07-12 RX ORDER — OXYCODONE HYDROCHLORIDE 10 MG/1
10 TABLET ORAL EVERY 4 HOURS PRN
Status: DISCONTINUED | OUTPATIENT
Start: 2025-07-12 | End: 2025-07-15 | Stop reason: HOSPADM

## 2025-07-12 RX ORDER — BUPIVACAINE HYDROCHLORIDE 7.5 MG/ML
INJECTION, SOLUTION INTRASPINAL AS NEEDED
Status: DISCONTINUED | OUTPATIENT
Start: 2025-07-12 | End: 2025-07-12 | Stop reason: SURG

## 2025-07-12 RX ORDER — ONDANSETRON 2 MG/ML
INJECTION INTRAMUSCULAR; INTRAVENOUS AS NEEDED
Status: DISCONTINUED | OUTPATIENT
Start: 2025-07-12 | End: 2025-07-12 | Stop reason: SURG

## 2025-07-12 RX ORDER — DOCUSATE SODIUM 100 MG/1
100 CAPSULE, LIQUID FILLED ORAL 2 TIMES DAILY PRN
Status: DISCONTINUED | OUTPATIENT
Start: 2025-07-12 | End: 2025-07-15 | Stop reason: HOSPADM

## 2025-07-12 RX ORDER — OXYTOCIN/0.9 % SODIUM CHLORIDE 30/500 ML
85 PLASTIC BAG, INJECTION (ML) INTRAVENOUS ONCE
Status: DISCONTINUED | OUTPATIENT
Start: 2025-07-12 | End: 2025-07-15 | Stop reason: HOSPADM

## 2025-07-12 RX ORDER — FENTANYL CITRATE 50 UG/ML
INJECTION, SOLUTION INTRAMUSCULAR; INTRAVENOUS AS NEEDED
Status: DISCONTINUED | OUTPATIENT
Start: 2025-07-12 | End: 2025-07-12 | Stop reason: SURG

## 2025-07-12 RX ORDER — NICOTINE 21 MG/24HR
1 PATCH, TRANSDERMAL 24 HOURS TRANSDERMAL EVERY 24 HOURS
Status: DISCONTINUED | OUTPATIENT
Start: 2025-07-12 | End: 2025-07-15 | Stop reason: HOSPADM

## 2025-07-12 RX ORDER — OXYTOCIN/0.9 % SODIUM CHLORIDE 30/500 ML
125 PLASTIC BAG, INJECTION (ML) INTRAVENOUS ONCE AS NEEDED
Status: DISCONTINUED | OUTPATIENT
Start: 2025-07-12 | End: 2025-07-15 | Stop reason: HOSPADM

## 2025-07-12 RX ORDER — OXYTOCIN/0.9 % SODIUM CHLORIDE 30/500 ML
PLASTIC BAG, INJECTION (ML) INTRAVENOUS AS NEEDED
Status: DISCONTINUED | OUTPATIENT
Start: 2025-07-12 | End: 2025-07-12 | Stop reason: SURG

## 2025-07-12 RX ORDER — ACETAMINOPHEN 500 MG
1000 TABLET ORAL EVERY 6 HOURS
Status: COMPLETED | OUTPATIENT
Start: 2025-07-12 | End: 2025-07-13

## 2025-07-12 RX ORDER — SODIUM CHLORIDE, SODIUM LACTATE, POTASSIUM CHLORIDE, CALCIUM CHLORIDE 600; 310; 30; 20 MG/100ML; MG/100ML; MG/100ML; MG/100ML
100 INJECTION, SOLUTION INTRAVENOUS CONTINUOUS
Status: DISCONTINUED | OUTPATIENT
Start: 2025-07-12 | End: 2025-07-12

## 2025-07-12 RX ORDER — ACETAMINOPHEN 500 MG
1000 TABLET ORAL ONCE
Status: COMPLETED | OUTPATIENT
Start: 2025-07-12 | End: 2025-07-12

## 2025-07-12 RX ORDER — NIFEDIPINE 10 MG/1
CAPSULE ORAL
Status: COMPLETED
Start: 2025-07-12 | End: 2025-07-12

## 2025-07-12 RX ORDER — MORPHINE SULFATE 0.5 MG/ML
INJECTION, SOLUTION EPIDURAL; INTRATHECAL; INTRAVENOUS AS NEEDED
Status: DISCONTINUED | OUTPATIENT
Start: 2025-07-12 | End: 2025-07-12 | Stop reason: SURG

## 2025-07-12 RX ORDER — MAGNESIUM SULFATE HEPTAHYDRATE 40 MG/ML
2 INJECTION, SOLUTION INTRAVENOUS CONTINUOUS
Status: DISCONTINUED | OUTPATIENT
Start: 2025-07-12 | End: 2025-07-13

## 2025-07-12 RX ORDER — ONDANSETRON 4 MG/1
4 TABLET, ORALLY DISINTEGRATING ORAL EVERY 6 HOURS PRN
Status: DISCONTINUED | OUTPATIENT
Start: 2025-07-12 | End: 2025-07-15 | Stop reason: HOSPADM

## 2025-07-12 RX ORDER — CARBOPROST TROMETHAMINE 250 UG/ML
250 INJECTION, SOLUTION INTRAMUSCULAR AS NEEDED
Status: DISCONTINUED | OUTPATIENT
Start: 2025-07-12 | End: 2025-07-15 | Stop reason: HOSPADM

## 2025-07-12 RX ADMIN — BUPIVACAINE HYDROCHLORIDE IN DEXTROSE 1.5 ML: 7.5 INJECTION, SOLUTION SUBARACHNOID at 06:11

## 2025-07-12 RX ADMIN — LABETALOL HYDROCHLORIDE 200 MG: 200 TABLET, FILM COATED ORAL at 22:38

## 2025-07-12 RX ADMIN — ACETAMINOPHEN 1000 MG: 500 TABLET, FILM COATED ORAL at 12:35

## 2025-07-12 RX ADMIN — SODIUM CHLORIDE 2 G: 900 INJECTION INTRAVENOUS at 05:56

## 2025-07-12 RX ADMIN — KETOROLAC TROMETHAMINE 15 MG: 15 INJECTION INTRAMUSCULAR at 14:45

## 2025-07-12 RX ADMIN — Medication 500 ML: at 06:54

## 2025-07-12 RX ADMIN — ONDANSETRON 4 MG: 2 INJECTION, SOLUTION INTRAMUSCULAR; INTRAVENOUS at 10:05

## 2025-07-12 RX ADMIN — NIFEDIPINE 30 MG: 30 TABLET, EXTENDED RELEASE ORAL at 21:26

## 2025-07-12 RX ADMIN — SODIUM CHLORIDE, POTASSIUM CHLORIDE, SODIUM LACTATE AND CALCIUM CHLORIDE 100 ML/HR: 600; 310; 30; 20 INJECTION, SOLUTION INTRAVENOUS at 00:47

## 2025-07-12 RX ADMIN — ACETAMINOPHEN 1000 MG: 500 TABLET, FILM COATED ORAL at 05:56

## 2025-07-12 RX ADMIN — MORPHINE SULFATE 0.15 MG: 0.5 INJECTION, SOLUTION EPIDURAL; INTRATHECAL; INTRAVENOUS at 06:11

## 2025-07-12 RX ADMIN — MAGNESIUM SULFATE IN WATER 2 G/HR: 20 INJECTION, SOLUTION INTRAVENOUS at 14:44

## 2025-07-12 RX ADMIN — SODIUM CHLORIDE, POTASSIUM CHLORIDE, SODIUM LACTATE AND CALCIUM CHLORIDE 75 ML/HR: 600; 310; 30; 20 INJECTION, SOLUTION INTRAVENOUS at 21:24

## 2025-07-12 RX ADMIN — OXYCODONE 5 MG: 5 TABLET ORAL at 10:54

## 2025-07-12 RX ADMIN — Medication 500 ML: at 06:30

## 2025-07-12 RX ADMIN — MIDAZOLAM HYDROCHLORIDE 1 MG: 1 INJECTION, SOLUTION INTRAMUSCULAR; INTRAVENOUS at 06:06

## 2025-07-12 RX ADMIN — KETOROLAC TROMETHAMINE 15 MG: 15 INJECTION INTRAMUSCULAR at 21:23

## 2025-07-12 RX ADMIN — MISOPROSTOL 800 MCG: 200 TABLET ORAL at 08:12

## 2025-07-12 RX ADMIN — KETOROLAC TROMETHAMINE 30 MG: 30 INJECTION INTRAMUSCULAR; INTRAVENOUS at 08:12

## 2025-07-12 RX ADMIN — OXYTOCIN 10 UNITS: 10 INJECTION INTRAVENOUS at 06:28

## 2025-07-12 RX ADMIN — NIFEDIPINE 30 MG: 30 TABLET, EXTENDED RELEASE ORAL at 12:36

## 2025-07-12 RX ADMIN — ACETAMINOPHEN 1000 MG: 500 TABLET, FILM COATED ORAL at 18:13

## 2025-07-12 RX ADMIN — SODIUM CHLORIDE, POTASSIUM CHLORIDE, SODIUM LACTATE AND CALCIUM CHLORIDE: 600; 310; 30; 20 INJECTION, SOLUTION INTRAVENOUS at 06:04

## 2025-07-12 RX ADMIN — VALACYCLOVIR 500 MG: 500 TABLET, FILM COATED ORAL at 01:44

## 2025-07-12 RX ADMIN — NIFEDIPINE 10 MG: 10 CAPSULE ORAL at 11:04

## 2025-07-12 RX ADMIN — ONDANSETRON 4 MG: 2 INJECTION INTRAMUSCULAR; INTRAVENOUS at 06:06

## 2025-07-12 RX ADMIN — FENTANYL CITRATE 20 MCG: 50 INJECTION, SOLUTION INTRAMUSCULAR; INTRAVENOUS at 06:11

## 2025-07-12 RX ADMIN — FAMOTIDINE 20 MG: 10 INJECTION, SOLUTION INTRAVENOUS at 06:06

## 2025-07-12 RX ADMIN — SODIUM CITRATE AND CITRIC ACID MONOHYDRATE 30 ML: 500; 334 SOLUTION ORAL at 05:56

## 2025-07-12 RX ADMIN — LABETALOL HYDROCHLORIDE 200 MG: 200 TABLET, FILM COATED ORAL at 15:41

## 2025-07-12 RX ADMIN — PROMETHAZINE HYDROCHLORIDE 25 MG: 25 TABLET ORAL at 14:45

## 2025-07-12 RX ADMIN — METOCLOPRAMIDE 10 MG: 5 INJECTION, SOLUTION INTRAMUSCULAR; INTRAVENOUS at 06:06

## 2025-07-12 NOTE — ANESTHESIA PREPROCEDURE EVALUATION
Anesthesia Evaluation     Patient summary reviewed and Nursing notes reviewed   NPO Solid Status: > 8 hours  NPO Liquid Status: > 2 hours           Airway   Mallampati: II  TM distance: >3 FB  Neck ROM: full  No difficulty expected  Dental      Pulmonary - negative pulmonary ROS    breath sounds clear to auscultation  Cardiovascular - negative cardio ROS    Rhythm: regular  Rate: normal        Neuro/Psych- negative ROS  GI/Hepatic/Renal/Endo - negative ROS     Musculoskeletal (-) negative ROS    Abdominal    Substance History - negative use     OB/GYN    (+) Pregnant, Preeclampsia    Comment: C/S for macrosomia      Other                    Anesthesia Plan    ASA 3     spinal and ITN       Anesthetic plan, risks, benefits, and alternatives have been provided, discussed and informed consent has been obtained with: patient.    Use of blood products discussed with patient .      CODE STATUS:    Code Status (Patient has no pulse and is not breathing): CPR (Attempt to Resuscitate)  Medical Interventions (Patient has pulse or is breathing): Full Support  Level Of Support Discussed With: Patient

## 2025-07-12 NOTE — LACTATION NOTE
25 1730   Maternal Information   Date of Referral 25   Person Making Referral lactation consultant  (courtesy visit, newly postpartum)   Maternal Reason for Referral no prior breastfeeding experience   Infant Reason for Referral  infant   Maternal Assessment   Breast Size Issue none   Breast Shape Bilateral:;round   Breast Density Bilateral:;soft   Nipples Bilateral:;short;other (see comments)  (small nipple shield)   Left Nipple Symptoms intact;nontender   Right Nipple Symptoms intact;nontender   Maternal Infant Feeding   Maternal Emotional State relaxed;receptive   Infant Positioning cross-cradle  (right)   Signs of Milk Transfer none noted;no audible swallow noted   Pain with Feeding no   Latch Assistance none needed   Support Person Involvement actively supporting mother   Milk Expression/Equipment   Breast Pump Type other (see comments)  (wants S2 when gets to Mother/Baby)   Lactation Referrals   Lactation Referrals outpatient lactation program   Outpatient Lactation Program Lactation Follow-up Date/Time prn after discharge     Courtesy visit to newly postpartum couplet. Mom reports breast feeding going well this far. Mom had independently latched infant at the breast before I walked into room. Mom using small nipple shield. Denies pain with feeds. Great positioning.  Great times so far. Reviewed breast feeding tips and information handouts with parents. They v/u. Mom wants S2 when gets up to Mother/Baby floor tomorrow. Encouraged to call lactation with any questions/concerns. Encouraged to call outpatient clinic prn after discharge.

## 2025-07-12 NOTE — LACTATION NOTE
Pt. Has not been able to rest. BP was elevated with visitors here. Visitors now gone and pt. Sleeping with sleep mask on. Please do not disturb at this time per floor RN.

## 2025-07-12 NOTE — H&P
"[facility]  Obstetric History and Physical    Referring Provider: Gracy Srivastava MD      Chief Complaint   Patient presents with    Elevated Blood Pressure     Has NST in office for elevated BP at home. In office was normal -130/80.  States at home was 165/110 and 10 min later was 175/120.  Denies swelling, HA (had one this AM which is resolved)        Subjective     Patient is a 34 y.o. female  currently at 38w6d, who presents with complaint of elevated blood pressure.  Patient reports blood pressure at home 160s to 170s over 100.  Patient denies swelling, headache, nausea, vomiting, leaking fluid, vaginal bleeding, reports normal fetal activity.  Patient seen in office earlier today after having elevated blood pressure at home and a headache.  After evaluation in the office blood pressure within normal limits labs ordered.   course complicated by suspected macrosomia.  Patient scheduled for a primary  delivery next week.  History of HSV-2 on suppressive Valtrex.        The following portions of the patients history were reviewed and updated as appropriate: current medications, allergies, past medical history, past surgical history, past family history, past social history, and problem list .       Prenatal Information:   Maternal Prenatal Labs  Blood Type No results found for: \"ABO\"   Rh Status No results found for: \"RH\"   Antibody Screen No results found for: \"ABSCRN\"   Gonnorhea No results found for: \"GCCX\"   Chlamydia No results found for: \"CLAMYDCU\"   RPR No results found for: \"RPR\"   Syphilis Antibody No results found for: \"SYPHILIS\"   Rubella No results found for: \"RUBELLAIGGIN\"   Hepatitis B Surface Antigen No results found for: \"HEPBSAG\"   HIV-1 Antibody No results found for: \"LABHIV1\"   Hepatitis C Antibody No results found for: \"HEPCAB\"   Rapid Urin Drug Screen No results found for: \"AMPMETHU\", \"BARBITSCNUR\", \"LABBENZSCN\", \"LABMETHSCN\", \"LABOPIASCN\", \"THCURSCR\", \"COCAINEUR\", " "\"AMPHETSCREEN\", \"PROPOXSCN\", \"BUPRENORSCNU\", \"METAMPSCNUR\", \"OXYCODONESCN\", \"TRICYCLICSCN\"   Group B Strep Culture No results found for: \"GBSANTIGEN\"           External Prenatal Results       Pregnancy Outside Results - Transcribed From Office Records - See Scanned Records For Details       Test Value Date Time    ABO  A  12/26/24 1016    Rh  Positive  12/26/24 1016    Antibody Screen  Negative  05/06/25 1451       Negative  12/26/24 1016    Varicella IgG       Rubella  1.96 index 12/26/24 1016    Hgb  13.6 g/dL 07/11/25 1355       12.6 g/dL 05/06/25 1451       13.0 g/dL 12/26/24 1016    Hct  40.8 % 07/11/25 1355       36.3 % 05/06/25 1451       39.0 % 12/26/24 1016    HgB A1c        1h GTT  110 mg/dL 05/06/25 1451    3h GTT Fasting       3h GTT 1 hour       3h GTT 2 hour       3h GTT 3 hour        Gonorrhea (discrete)  Negative  12/26/24 1016    Chlamydia (discrete)  Negative  12/26/24 1016    RPR  Non Reactive  05/06/25 1451       Non Reactive  12/26/24 1016    Syphils cascade: TP-Ab (FTA)       TP-Ab       TP-Ab (EIA)       TPPA       HBsAg  Negative  12/26/24 1016    Herpes Simplex Virus PCR       Herpes Simplex VIrus Culture       HIV  Non Reactive  12/26/24 1016    Hep C RNA Quant PCR       Hep C Antibody  Non Reactive  12/26/24 1016    AFP       NIPT       Cystic Fibrosis (Paul)       Cystic Fibroisis        Spinal Muscular atrophy       Fragile X       Group B Strep  No Group B Streptococcus isolated  06/24/25 1836    GBS Susceptibility to Clindamycin       GBS Susceptibility to Erythromycin       Fetal Fibronectin       Genetic Testing, Maternal Blood                 Drug Screening       Test Value Date Time    Urine Drug Screen       Amphetamine Screen  Negative ng/mL 01/23/25        CANCELED ng/mL 12/26/24 1016    Barbiturate Screen  Negative ng/mL 01/23/25        CANCELED  12/26/24 1016    Benzodiazepine Screen  Negative ng/mL 01/23/25        CANCELED  12/26/24 1016    Methadone Screen  Negative ng/mL " 25        CANCELED  24 1016    Phencyclidine Screen  Negative ng/mL 25        CANCELED  24 1016    Opiates Screen       THC Screen       Cocaine Screen       Propoxyphene Screen  Negative ng/mL 25        CANCELED  24 1016    Buprenorphine Screen       Methamphetamine Screen       Oxycodone Screen       Tricyclic Antidepressants Screen                 Legend    ^: Historical                              Past OB History:       OB History    Para Term  AB Living   1 0 0 0 0 0   SAB IAB Ectopic Molar Multiple Live Births   0 0 0 0 0 0      # Outcome Date GA Lbr Dain/2nd Weight Sex Type Anes PTL Lv   1 Current                Past Medical History: Past Medical History:   Diagnosis Date    Abnormal Pap smear of cervix     Female infertility 2024    Herpes 10/2023    History of medical problems     Needle stick in , signs of Raynauds (no diagnosis) this year in fingers and toes    HPV (human papilloma virus) infection     PMS (premenstrual syndrome)     Period Sxs occur around time of cycle even if no bleeding. Changes in mood, bloating, cramping. Managable    Pregnancy 2024    IUI    Varicella Childhood      Past Surgical History Past Surgical History:   Procedure Laterality Date    COLPOSCOPY      INTRAUTERINE DEVICE INSERTION        Family History: Family History   Problem Relation Age of Onset    Hypertension Mother     Hypertension Father     Diabetes Maternal Uncle     Diabetes Maternal Grandfather     Melanoma Maternal Grandfather         Dx age 82    COPD Maternal Grandfather     Hearing loss Maternal Grandfather     Heart disease Maternal Grandfather     Hypertension Paternal Grandfather     Diabetes Other     Heart disease Other     Hypertension Other     Thyroid disease Other     Breast cancer Other         Maternal Great GM    Thyroid disease Maternal Grandmother     Vision loss Maternal Grandmother       Social History:  reports that she quit  smoking about 5 years ago. Her smoking use included cigarettes. She started smoking about 5 years ago. She has a 0.1 pack-year smoking history. She has never been exposed to tobacco smoke. She has never used smokeless tobacco.   reports that she does not currently use alcohol after a past usage of about 4.0 standard drinks of alcohol per week.   reports no history of drug use.                   General ROS Negative Findings:Headaches, Visual Changes, Epigastric pain, Anorexia, Nausia/Vomiting, ROM, and Vaginal Bleeding    ROS     All other systems have been reviewed and are neg  Objective       Vital Signs Range for the last 24 hours  Temperature: Temp:  [98.3 °F (36.8 °C)] 98.3 °F (36.8 °C)   Temp Source: Temp src: Oral   BP: BP: (130-136)/() 136/109   Pulse: Heart Rate:  [76-83] 76   Respirations: Resp:  [16] 16   SPO2: SpO2:  [96 %] 96 %   O2 Amount (l/min):     O2 Devices     Weight: Weight:  [84.8 kg (187 lb)] 84.8 kg (187 lb)     Physical Examination:   General:   alert, appears stated age, and cooperative   Skin:   normal   HEENT:     Lungs:   clear to auscultation bilaterally   Heart:   regular rate and rhythm, S1, S2 normal, no murmur, click, rub or gallop   Gastrointestinal: Abdomen soft, gravities, guarding benign exam   Lower Extremities: No edema, no calf tenderness   :    Musculoskeletal:     Neuro: No focal deficits noted 2+4 no clonus             Fetal Heart Rate Assessment   Method:     Beats/min:     Baseline:     Varibility:     Accels:     Decels:     Tracing Category:     NST-indications hypertension, interpretation reactive, moderate variability, accelerations present 15 x 15, no fetal deceleration noted, onset 2244, end time 2336, irregular contractions noted  Uterine Assessment   Method:     Frequency (min):     Ctx Count in 10 min:     Duration:     Intensity:     Intensity by IUPC:     Resting Tone:     Resting Tone by IUPC:     [place] Units:       Laboratory Results:   Lab  Results (last 24 hours)       Procedure Component Value Units Date/Time    POC Protein, Urine, Qualitative, Dipstick [[id number]]  (Abnormal) Collected: 25    Specimen: Urine Updated: 25     Protein, POC 3+ mg/dL      Lot Number [id number]     Expiration Date 2027          Radiology Review:   Imaging Results (Last 24 Hours)       ** No results found for the last 24 hours. **          Other Studies:    Assessment & Plan       * No active hospital problems. *        Assessment:  1.  Intrauterine pregnancy at 38w6d gestation with reactive fetal status.    2.  Preeclampsia with severe features elevated creatinine  3.  Patient scheduled for primary  for suspected macrosomia  4.  HSV-2 on suppressive Valtrex    Plan:  1.  Magnesium sulfate 4 g bolus 1 g/h, control blood pressure if needed, since patient ate prior to presentation maintain n.p.o.  in  morning.  2. Plan of care has been reviewed with patient.  3.  Risks, benefits of treatment plan have been discussed.  4.  All questions have been answered.  5   discussed with Dr. Forester Quang Dean DO  2025  23:34 EDT

## 2025-07-12 NOTE — H&P
"[facility]  Obstetric History and Physical    Referring Provider: Gracy Srivastava MD      Chief Complaint   Patient presents with    Elevated Blood Pressure     Has NST in office for elevated BP at home. In office was normal -130/80.  States at home was 165/110 and 10 min later was 175/120.  Denies swelling, HA (had one this AM which is resolved)        Subjective     Patient is a 34 y.o. female  currently at 39w0d, who presented to L&D with elevated BP. She had just eaten supper, so magnesium sulfate started and patient admitted for planned  section .     Her prenatal care is complicated by  abnormal fetal growth  macrosomia.      The following portions of the patients history were reviewed and updated as appropriate: current medications , PMH, PSH, SocHx, FamHx.       Prenatal Information:   Maternal Prenatal Labs  Blood Type ABO Type   Date Value Ref Range Status   2025 A  Final      Rh Status RH type   Date Value Ref Range Status   2025 Positive  Final      Antibody Screen Antibody Screen   Date Value Ref Range Status   2025 Negative  Final      Gonnorhea No results found for: \"GCCX\"   Chlamydia No results found for: \"CLAMYDCU\"   RPR No results found for: \"RPR\"   Syphilis Antibody No results found for: \"SYPHILIS\"   Rubella No results found for: \"RUBELLAIGGIN\"   Hepatitis B Surface Antigen No results found for: \"HEPBSAG\"   HIV-1 Antibody No results found for: \"LABHIV1\"   Hepatitis C Antibody No results found for: \"HEPCAB\"   Rapid Urin Drug Screen No results found for: \"AMPMETHU\", \"BARBITSCNUR\", \"LABBENZSCN\", \"LABMETHSCN\", \"LABOPIASCN\", \"THCURSCR\", \"COCAINEUR\", \"AMPHETSCREEN\", \"PROPOXSCN\", \"BUPRENORSCNU\", \"METAMPSCNUR\", \"OXYCODONESCN\", \"TRICYCLICSCN\"   Group B Strep Culture No results found for: \"GBSANTIGEN\"           External Prenatal Results       Pregnancy Outside Results - Transcribed From Office Records - See Scanned Records For Details       Test Value Date Time    ABO  A  25 " 2336    Rh  Positive  07/11/25 2336    Antibody Screen  Negative  07/11/25 2336       Negative  05/06/25 1451       Negative  12/26/24 1016    Varicella IgG       Rubella  1.96 index 12/26/24 1016    Hgb  13.4 g/dL 07/11/25 2336       13.6 g/dL 07/11/25 1355       12.6 g/dL 05/06/25 1451       13.0 g/dL 12/26/24 1016    Hct  39.8 % 07/11/25 2336       40.8 % 07/11/25 1355       36.3 % 05/06/25 1451       39.0 % 12/26/24 1016    HgB A1c        1h GTT  110 mg/dL 05/06/25 1451    3h GTT Fasting       3h GTT 1 hour       3h GTT 2 hour       3h GTT 3 hour        Gonorrhea (discrete)  Negative  12/26/24 1016    Chlamydia (discrete)  Negative  12/26/24 1016    RPR  Non Reactive  05/06/25 1451       Non Reactive  12/26/24 1016    Syphils cascade: TP-Ab (FTA)       TP-Ab       TP-Ab (EIA)       TPPA       HBsAg  Negative  12/26/24 1016    Herpes Simplex Virus PCR       Herpes Simplex VIrus Culture       HIV  Non Reactive  12/26/24 1016    Hep C RNA Quant PCR       Hep C Antibody  Non Reactive  12/26/24 1016    AFP       NIPT       Cystic Fibrosis (Paul)       Cystic Fibroisis        Spinal Muscular atrophy       Fragile X       Group B Strep  No Group B Streptococcus isolated  06/24/25 1836    GBS Susceptibility to Clindamycin       GBS Susceptibility to Erythromycin       Fetal Fibronectin       Genetic Testing, Maternal Blood                 Drug Screening       Test Value Date Time    Urine Drug Screen       Amphetamine Screen  Negative ng/mL 01/23/25        CANCELED ng/mL 12/26/24 1016    Barbiturate Screen  Negative ng/mL 01/23/25        CANCELED  12/26/24 1016    Benzodiazepine Screen  Negative ng/mL 01/23/25        CANCELED  12/26/24 1016    Methadone Screen  Negative ng/mL 01/23/25        CANCELED  12/26/24 1016    Phencyclidine Screen  Negative ng/mL 01/23/25        CANCELED  12/26/24 1016    Opiates Screen       THC Screen       Cocaine Screen       Propoxyphene Screen  Negative ng/mL 01/23/25        CANCELED   24 1016    Buprenorphine Screen       Methamphetamine Screen       Oxycodone Screen       Tricyclic Antidepressants Screen                 Legend    ^: Historical                              Past OB History:       OB History    Para Term  AB Living   1 0 0 0 0 0   SAB IAB Ectopic Molar Multiple Live Births   0 0 0 0 0 0      # Outcome Date GA Lbr Dain/2nd Weight Sex Type Anes PTL Lv   1 Current                Past Medical History: Past Medical History:   Diagnosis Date    Abnormal Pap smear of cervix     Female infertility 2024    Herpes 10/2023    History of medical problems     Needle stick in , signs of Raynauds (no diagnosis) this year in fingers and toes    HPV (human papilloma virus) infection     PMS (premenstrual syndrome)     Period Sxs occur around time of cycle even if no bleeding. Changes in mood, bloating, cramping. Managable    Pregnancy 2024    IUI    Varicella Childhood      Past Surgical History Past Surgical History:   Procedure Laterality Date    COLPOSCOPY      INTRAUTERINE DEVICE INSERTION        Family History: Family History   Problem Relation Age of Onset    Hypertension Mother     Hypertension Father     Diabetes Maternal Uncle     Diabetes Maternal Grandfather     Melanoma Maternal Grandfather         Dx age 82    COPD Maternal Grandfather     Hearing loss Maternal Grandfather     Heart disease Maternal Grandfather     Hypertension Paternal Grandfather     Diabetes Other     Heart disease Other     Hypertension Other     Thyroid disease Other     Breast cancer Other         Maternal Great GM    Thyroid disease Maternal Grandmother     Vision loss Maternal Grandmother       Social History:  reports that she quit smoking about 5 years ago. Her smoking use included cigarettes. She started smoking about 5 years ago. She has a 0.1 pack-year smoking history. She has never been exposed to tobacco smoke. She has never used smokeless tobacco.   reports that  she does not currently use alcohol after a past usage of about 4.0 standard drinks of alcohol per week.   reports no history of drug use.                   General ROS Negative Findings:Headaches, Visual Changes, Epigastric pain, Anorexia, Nausia/Vomiting, ROM, and Vaginal Bleeding    ROS     All other systems have been reviewed and are neg  Objective       Vital Signs Range for the last 24 hours  Temperature: Temp:  [98.3 °F (36.8 °C)-99.1 °F (37.3 °C)] 99.1 °F (37.3 °C)   Temp Source: Temp src: Oral   BP: BP: (122-160)/() 125/80   Pulse: Heart Rate:  [60-83] 62   Respirations: Resp:  [14-16] 16   SPO2: SpO2:  [96 %-98 %] 96 %   O2 Amount (l/min):     O2 Devices     Weight: Weight:  [84.8 kg (187 lb)] 84.8 kg (187 lb)     Physical Examination:   General:   alert, appears stated age, and cooperative   Skin:   normal   HEENT:     Lungs:   clear to auscultation bilaterally   Heart:   regular rate and rhythm, S1, S2 normal, no murmur, click, rub or gallop   Abdomen:  soft, non-tender; bowel sounds normal; no masses,  no organomegaly   Lower Extremities    Pelvis:  Exam deferred.         Presentation: vertex   Cervix: Exam by:     Dilation:     Effacement:     Station:         Fetal Heart Rate Assessment   Method: Fetal HR Assessment Method: external   Beats/min: Fetal HR (beats/min): 130   Baseline: Fetal HR Baseline: normal range   Varibility: Fetal HR Variability: moderate (amplitude range 6 to 25 bpm)   Accels: Fetal HR Accelerations: greater than/equal to 15 bpm, lasting at least 15 seconds   Decels: Fetal HR Decelerations: absent   Tracing Category:       Uterine Assessment   Method: Method: external tocotransducer   Frequency (min): Contraction Frequency (Minutes): poor tracing   Ctx Count in 10 min:     Duration:     Intensity: Contraction Intensity: no contractions   Intensity by IUPC:     Resting Tone: Uterine Resting Tone: soft by palpation   Resting Tone by IUPC:     [place] Units:       Laboratory  Results:   Lab Results (last 24 hours)       Procedure Component Value Units Date/Time    Preeclampsia Panel [[id number]]  (Abnormal) Collected: 25    Specimen: Blood Updated: 25     Alkaline Phosphatase 229 U/L      ALT (SGPT) 23 U/L      AST (SGOT) 34 U/L      Comment: Specimen hemolyzed.  Result may be falsely elevated.        Creatinine 1.10 mg/dL      Total Bilirubin 0.2 mg/dL       U/L      Comment: Specimen hemolyzed.  Results may be affected.        Uric Acid 7.5 mg/dL     CBC (No Diff) [[id number]]  (Normal) Collected: 25    Specimen: Blood Updated: 25     WBC 8.23 10*3/mm3      RBC 4.59 10*6/mm3      Hemoglobin 13.4 g/dL      Hematocrit 39.8 %      MCV 86.7 fL      MCH 29.2 pg      MCHC 33.7 g/dL      RDW 14.3 %      RDW-SD 44.5 fl      MPV 11.0 fL      Platelets 147 10*3/mm3     Treponema pallidum AB w/Reflex RPR [[id number]] Collected: 25    Specimen: Blood Updated: 25    POC Protein, Urine, Qualitative, Dipstick [[id number]]  (Abnormal) Collected: 25    Specimen: Urine Updated: 25     Protein, POC 3+ mg/dL      Lot Number [id number]     Expiration Date 2027          Radiology Review:   Imaging Results (Last 24 Hours)       ** No results found for the last 24 hours. **          Other Studies:    Assessment & Plan       Macrosomia    Pregnancy        Assessment:  1.  Intrauterine pregnancy at 39w0d weeks gestation with reactive fetal status.    2.  Preeclampsia  3. Fetal macrosomia      Plan:  1. Primary  scheduled  2. Plan of care has been reviewed with patient.  3.  Risks, benefits of treatment plan have been discussed.  4.  All questions have been answered.  5      Dmitriy Cotto MD  2025  05:55 EDT

## 2025-07-12 NOTE — ANESTHESIA PROCEDURE NOTES
Spinal Block      Patient reassessed immediately prior to procedure    Patient location during procedure: OB  Performed By  Anesthesiologist: Garrett Boyer DO  Preanesthetic Checklist  Completed: patient identified, IV checked, site marked, risks and benefits discussed, surgical consent, monitors and equipment checked, pre-op evaluation and timeout performed  Spinal Block Prep:  Patient Position:sitting  Sterile Tech:cap, gloves, mask and sterile barriers  Prep:Chloraprep  Patient Monitoring:blood pressure monitoring, continuous pulse oximetry and EKG    Spinal Block Procedure  Approach:midline  Guidance:landmark technique and palpation technique  Location:L3-L4  Needle Type:Lita  Needle Gauge:25 G  Placement of Spinal needle event:cerebrospinal fluid aspirated  Paresthesia: no  Fluid Appearance:clear     Post Assessment  Patient Tolerance:patient tolerated the procedure well with no apparent complications  Complications no

## 2025-07-12 NOTE — OP NOTE
Section Procedure Note    Indications: macrosomia  Preeclampisa    Pre-operative Diagnosis: 39 week 0 day pregnancy.    Post-operative Diagnosis: same    Assist: Tania Ortiz, first assist    Surgeon: Dmitriy Cotto MD     Procedure: Primary  section, low transverse uterine incision    Anesthesia: Spinal anesthesia    ASA Class: 2    Procedure Details   The patient was seen in the Holding Room. The risks, benefits, complications, treatment options, and expected outcomes were discussed with the patient.  The patient concurred with the proposed plan, giving informed consent.  The site of surgery properly noted/marked. The patient was taken to the Operating Room, identified as Ilsa Bruno and the procedure verified as  Delivery. A Time Out was held and the above information confirmed.    After induction of anesthesia, the patient was draped and prepped in the usual sterile manner. A Pfannenstiel incision was made and carried down through the subcutaneous tissue to the fascia. Fascial incision was made and extended transversely. The fascia was  from the underlying rectus tissue superiorly and inferiorly. The peritoneum was identified and entered. Peritoneal incision was extended longitudinally.  A low transverse uterine incision was made. Delivered from vertex presentation was a girl with apgars scores of         APGARS  One minute Five minutes Ten minutes Fifteen minutes Twenty minutes   Skin color: 0   1             Heart rate: 2   2             Grimace: 2   2              Muscle tone: 2   2              Breathin   2              Totals: 8   9              . Delayed cord clamping performed and after the umbilical cord was clamped and cut cord blood was obtained for evaluation. The placenta was removed intact and appeared normal. The uterine outline, tubes and ovaries appeared normal. The uterine incision was closed with a running locked sutures of  1 Monocryl.  Hemostasis was observed and the uterus returned to the peritoneal cavity.  Pericolic gutters were irrigated and cleared of all clot and debris.  The rectus muscles were reapproximated with interrupted stitches of 2.0 vicryl. The fascia was then closed with running sutures of 1 looped PDS. The skin was reapproximated with 3-0 Monocryl followed by skin glue.    Instrument, sponge, and needle counts were correct prior the abdominal closure and at the conclusion of the case.     Findings:  Viable female infant    Estimated Blood Loss:  300ml           Drains: miller           Total IV Fluids: 1500ml           Specimens: placenta                   Complications:  None; patient tolerated the procedure well.           Disposition: PACU - hemodynamically stable.           Condition: stable    Attending Attestation: I performed the procedure.    Dmitriy Cotto MD  07/12/25

## 2025-07-12 NOTE — ANESTHESIA POSTPROCEDURE EVALUATION
Patient: Ilsa Bruno    Procedure Summary       Date: 25 Room / Location:  [facility]    Anesthesia Start: 604 Anesthesia Stop: 659    Procedure:  SECTION PRIMARY (Abdomen) Diagnosis:     Surgeons: Dmitriy Cotto MD Provider: Garrett Boyer DO    Anesthesia Type: spinal, ITN ASA Status: 2            Anesthesia Type: spinal, ITN    Vitals  Vitals Value Taken Time   /67 25 06:57   Temp 97.6 F    Pulse 76 25 07:00   Resp 16    SpO2 97 % 25 07:00   Vitals shown include unfiled device data.        Post Anesthesia Care and Evaluation    Patient location during evaluation: bedside  Patient participation: complete - patient participated  Level of consciousness: awake  Pain score: 0  Pain management: satisfactory to patient    Airway patency: patent  Anesthetic complications: No anesthetic complications  PONV Status: none  Cardiovascular status: acceptable and hemodynamically stable  Respiratory status: acceptable  Hydration status: acceptable

## 2025-07-13 LAB
BASOPHILS # BLD AUTO: 0.03 10*3/MM3 (ref 0–0.2)
BASOPHILS NFR BLD AUTO: 0.3 % (ref 0–1.5)
DEPRECATED RDW RBC AUTO: 48.2 FL (ref 37–54)
EOSINOPHIL # BLD AUTO: 0.03 10*3/MM3 (ref 0–0.4)
EOSINOPHIL NFR BLD AUTO: 0.3 % (ref 0.3–6.2)
ERYTHROCYTE [DISTWIDTH] IN BLOOD BY AUTOMATED COUNT: 14.6 % (ref 12.3–15.4)
HCT VFR BLD AUTO: 33.5 % (ref 34–46.6)
HGB BLD-MCNC: 11 G/DL (ref 12–15.9)
IMM GRANULOCYTES # BLD AUTO: 0.05 10*3/MM3 (ref 0–0.05)
IMM GRANULOCYTES NFR BLD AUTO: 0.6 % (ref 0–0.5)
LYMPHOCYTES # BLD AUTO: 1.54 10*3/MM3 (ref 0.7–3.1)
LYMPHOCYTES NFR BLD AUTO: 17.4 % (ref 19.6–45.3)
MCH RBC QN AUTO: 30.1 PG (ref 26.6–33)
MCHC RBC AUTO-ENTMCNC: 32.8 G/DL (ref 31.5–35.7)
MCV RBC AUTO: 91.5 FL (ref 79–97)
MONOCYTES # BLD AUTO: 0.44 10*3/MM3 (ref 0.1–0.9)
MONOCYTES NFR BLD AUTO: 5 % (ref 5–12)
NEUTROPHILS NFR BLD AUTO: 6.74 10*3/MM3 (ref 1.7–7)
NEUTROPHILS NFR BLD AUTO: 76.4 % (ref 42.7–76)
NRBC BLD AUTO-RTO: 0 /100 WBC (ref 0–0.2)
PLATELET # BLD AUTO: 142 10*3/MM3 (ref 140–450)
PMV BLD AUTO: 10.4 FL (ref 6–12)
RBC # BLD AUTO: 3.66 10*6/MM3 (ref 3.77–5.28)
WBC NRBC COR # BLD AUTO: 8.83 10*3/MM3 (ref 3.4–10.8)

## 2025-07-13 PROCEDURE — 0503F POSTPARTUM CARE VISIT: CPT | Performed by: OBSTETRICS & GYNECOLOGY

## 2025-07-13 PROCEDURE — 85025 COMPLETE CBC W/AUTO DIFF WBC: CPT | Performed by: OBSTETRICS & GYNECOLOGY

## 2025-07-13 PROCEDURE — 25010000002 MAGNESIUM SULFATE 20 GM/500ML SOLUTION: Performed by: OBSTETRICS & GYNECOLOGY

## 2025-07-13 PROCEDURE — 25010000002 KETOROLAC TROMETHAMINE PER 15 MG: Performed by: OBSTETRICS & GYNECOLOGY

## 2025-07-13 RX ADMIN — SIMETHICONE 80 MG: 80 TABLET, CHEWABLE ORAL at 16:20

## 2025-07-13 RX ADMIN — Medication 10 ML: at 20:34

## 2025-07-13 RX ADMIN — Medication 10 ML: at 09:34

## 2025-07-13 RX ADMIN — ACETAMINOPHEN 1000 MG: 500 TABLET, FILM COATED ORAL at 00:40

## 2025-07-13 RX ADMIN — KETOROLAC TROMETHAMINE 15 MG: 15 INJECTION INTRAMUSCULAR at 09:34

## 2025-07-13 RX ADMIN — DOCUSATE SODIUM 100 MG: 100 CAPSULE, LIQUID FILLED ORAL at 20:34

## 2025-07-13 RX ADMIN — KETOROLAC TROMETHAMINE 15 MG: 15 INJECTION INTRAMUSCULAR at 03:29

## 2025-07-13 RX ADMIN — ACETAMINOPHEN 1000 MG: 500 TABLET, FILM COATED ORAL at 07:20

## 2025-07-13 RX ADMIN — ACETAMINOPHEN 650 MG: 325 TABLET ORAL at 18:14

## 2025-07-13 RX ADMIN — PRENATAL VITAMINS-IRON FUMARATE 27 MG IRON-FOLIC ACID 0.8 MG TABLET 1 TABLET: at 09:35

## 2025-07-13 RX ADMIN — OXYCODONE 5 MG: 5 TABLET ORAL at 16:20

## 2025-07-13 RX ADMIN — NIFEDIPINE 30 MG: 30 TABLET, EXTENDED RELEASE ORAL at 20:34

## 2025-07-13 RX ADMIN — IBUPROFEN 600 MG: 600 TABLET ORAL at 20:34

## 2025-07-13 RX ADMIN — IBUPROFEN 600 MG: 600 TABLET ORAL at 15:15

## 2025-07-13 RX ADMIN — MAGNESIUM SULFATE IN WATER 2 G/HR: 20 INJECTION, SOLUTION INTRAVENOUS at 01:44

## 2025-07-13 RX ADMIN — ACETAMINOPHEN 650 MG: 325 TABLET ORAL at 12:42

## 2025-07-13 NOTE — ANESTHESIA POSTPROCEDURE EVALUATION
Patient: Ilsa Bruno    Procedure Summary       Date: 25 Room / Location:  [facility]    Anesthesia Start: 604 Anesthesia Stop: 659    Procedure:  SECTION PRIMARY (Abdomen) Diagnosis:     Surgeons: Dmitriy Cotto MD Provider: Garrett Boyer DO    Anesthesia Type: spinal, ITN ASA Status: 2            Anesthesia Type: spinal, ITN    Vitals  Vitals Value Taken Time   /90 25 12:01   Temp 98.5 °F (36.9 °C) 25 12:01   Pulse 59 25 12:01   Resp 16 25 12:01   SpO2 99 % 25 12:01           Post Anesthesia Care and Evaluation    Patient location during evaluation: bedside  Patient participation: complete - patient participated  Level of consciousness: awake  Pain score: 3  Pain management: satisfactory to patient    Airway patency: patent  Anesthetic complications: No anesthetic complications  PONV Status: none  Cardiovascular status: acceptable and hemodynamically stable  Respiratory status: acceptable  Hydration status: acceptable  Post Neuraxial Block status: Motor and sensory function returned to baseline and No signs or symptoms of PDPH

## 2025-07-13 NOTE — PROGRESS NOTES
Postpartum Progress Note    Patient name: Ilsa Bruno  YOB: 1990   MRN: [id number]  Referring Provider: Gracy Srivastava MD  Admission Date: 2025  Date of Service: 2025    ID: 34 y.o.     Diagnosis:   S/p  delivery 1 Day Post-Op     Macrosomia    Pregnancy       Subjective:      No complaints.  Moderate lochia.  No issues and now off of magnesium sulfate  Ambulating, voiding, tolerating diet.  Pain well controlled.  The patient is currently breastfeeding.   This baby is a girl.    Objective:      Vital signs:  Vital Signs Range for the last 24 hours  Temperature: Temp:  [97.2 °F (36.2 °C)-99.2 °F (37.3 °C)] 99.2 °F (37.3 °C)   Temp Source: Temp src: Oral   BP: BP: ()/() 118/81   Pulse: Heart Rate:  [54-89] 80   Respirations: Resp:  [16-20] 18   Weight: 84.8 kg (187 lb)     General: Alert & oriented x4, in no apparent distress  Abdomen: soft, nontender  Uterus: firm, nontender  Incision: clean, dry, intact, dressing clean, sutures  Extremities: nontender; no edema      Labs:  Lab Results   Component Value Date    WBC 8.83 2025    HGB 11.0 (L) 2025    HCT 33.5 (L) 2025    MCV 91.5 2025     2025     Results from last 7 days   Lab Units 25  2336   ABO TYPING  A   RH TYPING  Positive     External Prenatal Results       Pregnancy Outside Results - Transcribed From Office Records - See Scanned Records For Details       Test Value Date Time    ABO  A  25 2336    Rh  Positive  25 2336    Antibody Screen  Negative  25 2336       Negative  25 1451       Negative  24 1016    Varicella IgG       Rubella  1.96 index 24 1016    Hgb  11.0 g/dL 25 0509       13.4 g/dL 25 2336       13.6 g/dL 25 1355       12.6 g/dL 25 1451       13.0 g/dL 24 1016    Hct  33.5 % 25 0509       39.8 % 25 2336       40.8 % 25 1355       36.3 % 25 1451       39.0  % 24 1016    HgB A1c        1h GTT  110 mg/dL 25 1451    3h GTT Fasting       3h GTT 1 hour       3h GTT 2 hour       3h GTT 3 hour        Gonorrhea (discrete)  Negative  24 1016    Chlamydia (discrete)  Negative  24 1016    RPR  Non Reactive  25 1451       Non Reactive  24 1016    Syphils cascade: TP-Ab (FTA)  Non-Reactive  25 2336    TP-Ab  Non-Reactive  25 2336    TP-Ab (EIA)       TPPA       HBsAg  Negative  24 1016    Herpes Simplex Virus PCR       Herpes Simplex VIrus Culture       HIV  Non Reactive  24 1016    Hep C RNA Quant PCR       Hep C Antibody  Non Reactive  24 1016    AFP       NIPT       Cystic Fibrosis (Paul)       Cystic Fibroisis        Spinal Muscular atrophy       Fragile X       Group B Strep  No Group B Streptococcus isolated  25 1836    GBS Susceptibility to Clindamycin       GBS Susceptibility to Erythromycin       Fetal Fibronectin       Genetic Testing, Maternal Blood                 Drug Screening       Test Value Date Time    Urine Drug Screen       Amphetamine Screen  Negative ng/mL 25        CANCELED ng/mL 24 1016    Barbiturate Screen  Negative ng/mL 25        CANCELED  24 1016    Benzodiazepine Screen  Negative ng/mL 25        CANCELED  24 1016    Methadone Screen  Negative ng/mL 25        CANCELED  24 1016    Phencyclidine Screen  Negative ng/mL 25        CANCELED  24 1016    Opiates Screen       THC Screen       Cocaine Screen       Propoxyphene Screen  Negative ng/mL 25        CANCELED  24 1016    Buprenorphine Screen       Methamphetamine Screen       Oxycodone Screen       Tricyclic Antidepressants Screen                 Legend    ^: Historical                            Assessment/Plan:      1 Day Post-Op s/p Procedure(s):   SECTION PRIMARY  1. S/p  delivery: Continue postoperative care.  Doing well.  2. Infant  feeding: Supportive care.  The patient is currently breastfeeding.  3. Severe preeclampsia: DC mag this morning and continue po procardia 30mg XL BID.   3. Contraception: Education given regarding options for contraception, including barrier methods, injectable contraception, IUD placement, oral contraceptives.    Dmitriy Cotto MD  07/13/25

## 2025-07-14 LAB
ALP SERPL-CCNC: 153 U/L (ref 39–117)
ALT SERPL W P-5'-P-CCNC: 14 U/L (ref 1–33)
ALT SERPL-CCNC: 23 U/L (ref 1–33)
AST SERPL-CCNC: 26 U/L (ref 1–32)
AST SERPL-CCNC: 28 U/L (ref 1–32)
BASOPHILS # BLD AUTO: 0.03 10*3/MM3 (ref 0–0.2)
BASOPHILS # BLD AUTO: 0.03 10E3/MM3 (ref 0–0.2)
BASOPHILS NFR BLD AUTO: 0.4 % (ref 0–1.5)
BASOPHILS NFR BLD AUTO: 0.4 % (ref 0–1.5)
BILIRUB SERPL-MCNC: 0.2 MG/DL (ref 0–1.2)
BUN SERPL-MCNC: ABNORMAL MG/DL
CREAT SERPL-MCNC: 0.74 MG/DL (ref 0.57–1)
CREAT SERPL-MCNC: 0.95 MG/DL (ref 0.57–1)
CREAT UR-MCNC: ABNORMAL MG/DL
DEPRECATED RDW RBC AUTO: 50.8 FL (ref 37–54)
EOSINOPHIL # BLD AUTO: 0.01 10E3/MM3 (ref 0–0.4)
EOSINOPHIL # BLD AUTO: 0.12 10*3/MM3 (ref 0–0.4)
EOSINOPHIL NFR BLD AUTO: 0.1 % (ref 0.3–6.2)
EOSINOPHIL NFR BLD AUTO: 1.4 % (ref 0.3–6.2)
ERYTHROCYTE [DISTWIDTH] IN BLOOD BY AUTOMATED COUNT: 14.3 % (ref 12.3–15.4)
ERYTHROCYTE [DISTWIDTH] IN BLOOD BY AUTOMATED COUNT: 15.1 % (ref 12.3–15.4)
HCT VFR BLD AUTO: 32.6 % (ref 34–46.6)
HCT VFR BLD AUTO: 40.8 % (ref 34–46.6)
HGB BLD-MCNC: 10.4 G/DL (ref 12–15.9)
HGB BLD-MCNC: 13.6 G/DL (ref 12–15.9)
IMM GRANULOCYTES # BLD AUTO: 0.03 10E3/MM3 (ref 0–0.05)
IMM GRANULOCYTES # BLD AUTO: 0.04 10*3/MM3 (ref 0–0.05)
IMM GRANULOCYTES NFR BLD AUTO: 0.4 % (ref 0–0.5)
IMM GRANULOCYTES NFR BLD AUTO: 0.5 % (ref 0–0.5)
LDH SERPL L TO P-CCNC: 217 U/L (ref 135–214)
LDH SERPL-CCNC: 303 U/L (ref 135–214)
LYMPHOCYTES # BLD AUTO: 1.49 10E3/MM3 (ref 0.7–3.1)
LYMPHOCYTES # BLD AUTO: 1.51 10*3/MM3 (ref 0.7–3.1)
LYMPHOCYTES NFR BLD AUTO: 17.7 % (ref 19.6–45.3)
LYMPHOCYTES NFR BLD AUTO: 20.2 % (ref 19.6–45.3)
MCH RBC QN AUTO: 29.6 PG (ref 26.6–33)
MCH RBC QN AUTO: 29.7 PG (ref 26.6–33)
MCHC RBC AUTO-ENTMCNC: 31.9 G/DL (ref 31.5–35.7)
MCHC RBC AUTO-ENTMCNC: 33.3 G/DL (ref 31.5–35.7)
MCV RBC AUTO: 88.7 FL (ref 79–97)
MCV RBC AUTO: 93.1 FL (ref 79–97)
MICROALBUMIN UR-MCNC: ABNORMAL
MONOCYTES # BLD AUTO: 0.48 10*3/MM3 (ref 0.1–0.9)
MONOCYTES # BLD AUTO: 0.61 10E3/MM3 (ref 0.1–0.9)
MONOCYTES NFR BLD AUTO: 5.6 % (ref 5–12)
MONOCYTES NFR BLD AUTO: 8.3 % (ref 5–12)
NEUTROPHILS # BLD AUTO: 5.19 10E3/MM3 (ref 1.7–7)
NEUTROPHILS NFR BLD AUTO: 6.36 10*3/MM3 (ref 1.7–7)
NEUTROPHILS NFR BLD AUTO: 70.6 % (ref 42.7–76)
NEUTROPHILS NFR BLD AUTO: 74.4 % (ref 42.7–76)
NRBC BLD AUTO-RTO: 0 /100 WBC (ref 0–0.2)
NRBC BLD AUTO-RTO: 0 /100 WBC (ref 0–0.2)
PLATELET # BLD AUTO: 147 10E3/MM3 (ref 140–450)
PLATELET # BLD AUTO: 162 10*3/MM3 (ref 140–450)
PMV BLD AUTO: 10.6 FL (ref 6–12)
RBC # BLD AUTO: 3.5 10*6/MM3 (ref 3.77–5.28)
RBC # BLD AUTO: 4.6 10E6/MM3 (ref 3.77–5.28)
SPECIMEN STATUS: NORMAL
URATE SERPL-MCNC: 6.3 MG/DL (ref 2.4–5.7)
URATE SERPL-MCNC: 7.7 MG/DL (ref 2.4–5.7)
WBC # BLD AUTO: 7.36 10E3/MM3 (ref 3.4–10.8)
WBC NRBC COR # BLD AUTO: 8.54 10*3/MM3 (ref 3.4–10.8)

## 2025-07-14 PROCEDURE — 82565 ASSAY OF CREATININE: CPT | Performed by: OBSTETRICS & GYNECOLOGY

## 2025-07-14 PROCEDURE — 82247 BILIRUBIN TOTAL: CPT | Performed by: OBSTETRICS & GYNECOLOGY

## 2025-07-14 PROCEDURE — 83615 LACTATE (LD) (LDH) ENZYME: CPT | Performed by: OBSTETRICS & GYNECOLOGY

## 2025-07-14 PROCEDURE — 84550 ASSAY OF BLOOD/URIC ACID: CPT | Performed by: OBSTETRICS & GYNECOLOGY

## 2025-07-14 PROCEDURE — 84075 ASSAY ALKALINE PHOSPHATASE: CPT | Performed by: OBSTETRICS & GYNECOLOGY

## 2025-07-14 PROCEDURE — 84450 TRANSFERASE (AST) (SGOT): CPT | Performed by: OBSTETRICS & GYNECOLOGY

## 2025-07-14 PROCEDURE — 0503F POSTPARTUM CARE VISIT: CPT | Performed by: ADVANCED PRACTICE MIDWIFE

## 2025-07-14 PROCEDURE — 85025 COMPLETE CBC W/AUTO DIFF WBC: CPT | Performed by: OBSTETRICS & GYNECOLOGY

## 2025-07-14 PROCEDURE — 84460 ALANINE AMINO (ALT) (SGPT): CPT | Performed by: OBSTETRICS & GYNECOLOGY

## 2025-07-14 RX ADMIN — DOCUSATE SODIUM 100 MG: 100 CAPSULE, LIQUID FILLED ORAL at 20:11

## 2025-07-14 RX ADMIN — SIMETHICONE 80 MG: 80 TABLET, CHEWABLE ORAL at 18:05

## 2025-07-14 RX ADMIN — ACETAMINOPHEN 650 MG: 325 TABLET ORAL at 18:02

## 2025-07-14 RX ADMIN — ACETAMINOPHEN 650 MG: 325 TABLET ORAL at 11:43

## 2025-07-14 RX ADMIN — IBUPROFEN 600 MG: 600 TABLET ORAL at 02:02

## 2025-07-14 RX ADMIN — IBUPROFEN 600 MG: 600 TABLET ORAL at 08:19

## 2025-07-14 RX ADMIN — NIFEDIPINE 30 MG: 30 TABLET, EXTENDED RELEASE ORAL at 20:12

## 2025-07-14 RX ADMIN — IBUPROFEN 600 MG: 600 TABLET ORAL at 14:45

## 2025-07-14 RX ADMIN — DOCUSATE SODIUM 100 MG: 100 CAPSULE, LIQUID FILLED ORAL at 08:18

## 2025-07-14 RX ADMIN — SIMETHICONE 80 MG: 80 TABLET, CHEWABLE ORAL at 00:39

## 2025-07-14 RX ADMIN — ACETAMINOPHEN 650 MG: 325 TABLET ORAL at 00:38

## 2025-07-14 RX ADMIN — ACETAMINOPHEN 650 MG: 325 TABLET ORAL at 23:05

## 2025-07-14 RX ADMIN — IBUPROFEN 600 MG: 600 TABLET ORAL at 20:12

## 2025-07-14 RX ADMIN — ACETAMINOPHEN 650 MG: 325 TABLET ORAL at 06:38

## 2025-07-14 RX ADMIN — SIMETHICONE 80 MG: 80 TABLET, CHEWABLE ORAL at 07:33

## 2025-07-14 RX ADMIN — NIFEDIPINE 30 MG: 30 TABLET, EXTENDED RELEASE ORAL at 08:18

## 2025-07-14 RX ADMIN — PRENATAL VITAMINS-IRON FUMARATE 27 MG IRON-FOLIC ACID 0.8 MG TABLET 1 TABLET: at 08:18

## 2025-07-14 RX ADMIN — Medication 1 APPLICATION: at 23:09

## 2025-07-14 NOTE — LACTATION NOTE
07/14/25 0815   Maternal Information   Date of Referral 07/14/25   Person Making Referral lactation consultant  (Courtesy lacation follow up visit for 9.8% wt loss. Mother had infant at breast in RCC position. Infant was fully dressed & a little sleepy. Mother enc'd to undress infant for feeds in diaper only to keep infant awake.)   Maternal Assessment   Left Nipple Symptoms intact;tender   Right Nipple Symptoms intact;tender  (pt reports the initial latch is a bit painful however once latched it is comfortable. I explained that it only takes 1 or 2 shallow latches to cause some nipple tenderness. Pt believes this to be the case.)   Maternal Infant Feeding   Maternal Emotional State independent;receptive;relaxed   Infant Positioning cross-cradle  (Right)   Signs of Milk Transfer deep jaw excursions noted  (occassional deep jaw excursion however infant sleepy.)   Pain with Feeding   (see above comment.)   Latch Assistance verbal guidance offered   Milk Expression/Equipment   Breast Pump Type   (Enc'd pt to begin pumping after feeds to expedite milk production d/t inc wt loss.)   Breast Pumping   Breast Pumping Interventions   (reminded pt to follow up in the outpatient lactation clinic if she needs help after discharge.)

## 2025-07-14 NOTE — PROGRESS NOTES
Postpartum Progress Note    Patient name: Ilsa Bruno  YOB: 1990   MRN: [id number]  Referring Provider: Gracy Srivastava MD  Admission Date: 2025  Date of Service: 2025    ID: 34 y.o.     Diagnosis:   S/p  delivery 2 Days Post-Op     Macrosomia    Pregnancy       Subjective:      No complaints.  Moderate lochia.  Ambulating, voiding, tolerating diet.  Pain well controlled.  Denies headache, vision changes, RUQ pain.   The patient is currently breastfeeding. Baby is latching well. She is currently concerned because baby has lost 9.9% off her birth weight.   This baby is a girl.    Objective:      Vital signs:  Vital Signs Range for the last 24 hours  Temperature: Temp:  [98.2 °F (36.8 °C)-98.9 °F (37.2 °C)] 98.9 °F (37.2 °C)   Temp Source: Temp src: Oral   BP: BP: (127-143)/(83-88) 127/84   Pulse: Heart Rate:  [64-80] 77   Respirations: Resp:  [16-18] 18   Weight: 84.8 kg (187 lb)     General: Alert & oriented x4, in no apparent distress  Abdomen: soft, nontender  Uterus: firm, nontender  Incision: clean, dry, glue intact  Extremities: nontender; no edema      Labs:  Lab Results   Component Value Date    WBC 8.54 2025    HGB 10.4 (L) 2025    HCT 32.6 (L) 2025    MCV 93.1 2025     2025     Results from last 7 days   Lab Units 25  233   ABO TYPING  A   RH TYPING  Positive     External Prenatal Results       Pregnancy Outside Results - Transcribed From Office Records - See Scanned Records For Details       Test Value Date Time    ABO  A  25    Rh  Positive  25    Antibody Screen  Negative  25 2336       Negative  25 1451       Negative  24 1016    Varicella IgG       Rubella  1.96 index 24 1016    Hgb  10.4 g/dL 25 0510       11.0 g/dL 25 0509       13.4 g/dL 25       13.6 g/dL 25 1355       13.6 g/dL 25 1355       12.6 g/dL 25 1453        13.0 g/dL 12/26/24 1016    Hct  32.6 % 07/14/25 0510       33.5 % 07/13/25 0509       39.8 % 07/11/25 2336       40.8 % 07/11/25 1355       40.8 % 07/11/25 1355       36.3 % 05/06/25 1451       39.0 % 12/26/24 1016    HgB A1c        1h GTT  110 mg/dL 05/06/25 1451    3h GTT Fasting       3h GTT 1 hour       3h GTT 2 hour       3h GTT 3 hour        Gonorrhea (discrete)  Negative  12/26/24 1016    Chlamydia (discrete)  Negative  12/26/24 1016    RPR  Non Reactive  05/06/25 1451       Non Reactive  12/26/24 1016    Syphils cascade: TP-Ab (FTA)  Non-Reactive  07/11/25 2336    TP-Ab  Non-Reactive  07/11/25 2336    TP-Ab (EIA)       TPPA       HBsAg  Negative  12/26/24 1016    Herpes Simplex Virus PCR       Herpes Simplex VIrus Culture       HIV  Non Reactive  12/26/24 1016    Hep C RNA Quant PCR       Hep C Antibody  Non Reactive  12/26/24 1016    AFP       NIPT       Cystic Fibrosis (Paul)       Cystic Fibroisis        Spinal Muscular atrophy       Fragile X       Group B Strep  No Group B Streptococcus isolated  06/24/25 1836    GBS Susceptibility to Clindamycin       GBS Susceptibility to Erythromycin       Fetal Fibronectin       Genetic Testing, Maternal Blood                 Drug Screening       Test Value Date Time    Urine Drug Screen       Amphetamine Screen  Negative ng/mL 01/23/25        CANCELED ng/mL 12/26/24 1016    Barbiturate Screen  Negative ng/mL 01/23/25        CANCELED  12/26/24 1016    Benzodiazepine Screen  Negative ng/mL 01/23/25        CANCELED  12/26/24 1016    Methadone Screen  Negative ng/mL 01/23/25        CANCELED  12/26/24 1016    Phencyclidine Screen  Negative ng/mL 01/23/25        CANCELED  12/26/24 1016    Opiates Screen       THC Screen       Cocaine Screen       Propoxyphene Screen  Negative ng/mL 01/23/25        CANCELED  12/26/24 1016    Buprenorphine Screen       Methamphetamine Screen       Oxycodone Screen       Tricyclic Antidepressants Screen                 Legend    ^:  Historical                            Assessment/Plan:      2 Days Post-Op s/p Procedure(s):   SECTION PRIMARY  1. S/p  delivery: Continue postoperative care.  Doing well.  2. Infant feeding: Supportive care.  The patient is currently breastfeeding.  3. Preeclampsia with severe features: S/p Magnesium, BP's controlled on procardia 30mg XL twice daily. LFT's are now normal. Uric acid decreasing. Discussed symptoms to monitor for and reviewed typical follow up after discharge.

## 2025-07-15 VITALS
DIASTOLIC BLOOD PRESSURE: 85 MMHG | HEART RATE: 81 BPM | HEIGHT: 64 IN | OXYGEN SATURATION: 99 % | SYSTOLIC BLOOD PRESSURE: 123 MMHG | WEIGHT: 187 LBS | BODY MASS INDEX: 31.92 KG/M2 | TEMPERATURE: 98.5 F | RESPIRATION RATE: 18 BRPM

## 2025-07-15 PROCEDURE — 99202 OFFICE O/P NEW SF 15 MIN: CPT | Performed by: OBSTETRICS & GYNECOLOGY

## 2025-07-15 PROCEDURE — 0503F POSTPARTUM CARE VISIT: CPT

## 2025-07-15 PROCEDURE — 59025 FETAL NON-STRESS TEST: CPT | Performed by: OBSTETRICS & GYNECOLOGY

## 2025-07-15 RX ORDER — IBUPROFEN 600 MG/1
600 TABLET, FILM COATED ORAL EVERY 6 HOURS
Qty: 30 TABLET | Refills: 0 | Status: SHIPPED | OUTPATIENT
Start: 2025-07-15

## 2025-07-15 RX ORDER — OXYCODONE HYDROCHLORIDE 5 MG/1
5 TABLET ORAL EVERY 6 HOURS PRN
Qty: 12 TABLET | Refills: 0 | Status: SHIPPED | OUTPATIENT
Start: 2025-07-15

## 2025-07-15 RX ORDER — NIFEDIPINE 30 MG
30 TABLET, EXTENDED RELEASE ORAL 2 TIMES DAILY
Qty: 60 TABLET | Refills: 0 | Status: SHIPPED | OUTPATIENT
Start: 2025-07-15

## 2025-07-15 RX ADMIN — ACETAMINOPHEN 650 MG: 325 TABLET ORAL at 05:00

## 2025-07-15 RX ADMIN — IBUPROFEN 600 MG: 600 TABLET ORAL at 03:01

## 2025-07-15 RX ADMIN — IBUPROFEN 600 MG: 600 TABLET ORAL at 08:51

## 2025-07-15 RX ADMIN — PRENATAL VITAMINS-IRON FUMARATE 27 MG IRON-FOLIC ACID 0.8 MG TABLET 1 TABLET: at 08:51

## 2025-07-15 RX ADMIN — NIFEDIPINE 30 MG: 30 TABLET, EXTENDED RELEASE ORAL at 08:51

## 2025-07-15 RX ADMIN — DOCUSATE SODIUM 100 MG: 100 CAPSULE, LIQUID FILLED ORAL at 08:51

## 2025-07-15 RX ADMIN — SIMETHICONE 80 MG: 80 TABLET, CHEWABLE ORAL at 01:47

## 2025-07-15 NOTE — DISCHARGE SUMMARY
Discharge Summary    Date of Admission: 2025  Date of Discharge:  7/15/2025      Patient: Isla Bruno      MR#:[id number]    Primary Surgeon/OB: Dmitriy Cotto MD    Discharge Surgeon/OB: Vladislav    Presenting Problem/History of Present Illness  Macrosomia [P08.0]  Pregnancy [Z34.90]       Macrosomia    Pregnancy         Discharge Diagnosis:  section at 39w0d    Procedures:  , Low Transverse    2025   6:26 AM         Discharge Date: 7/15/2025; Discharge Time: 09:41 EDT    Early Discharge:  NO    Hospital Course  Patient is a 34 y.o. female  at 39w0d status post  section with uneventful postoperative recovery. She received IV Magnesium for severe preeclampsia. Blood pressure managed on Nifedipine 30mg XL BID. Patient was advanced to regular diet on postoperative day#1. On discharge, ambulating, tolerating a regular diet without any difficulties, denies preeclampsia symptoms, and her incision is dry, clean and intact.     Infant:   female fetus 3840 g (8 lb 7.5 oz) with Apgar scores of 8 , 9  at five minutes.    Condition on Discharge:  Stable    Vital Signs  Temp:  [98.4 °F (36.9 °C)-99 °F (37.2 °C)] 98.5 °F (36.9 °C)  Heart Rate:  [75-83] 81  Resp:  [18] 18  BP: (123-141)/(85-93) 123/85    Lab Results   Component Value Date    WBC 8.54 2025    HGB 10.4 (L) 2025    HCT 32.6 (L) 2025    MCV 93.1 2025     2025       Discharge Disposition  Home or Self Care    Discharge Medications     Discharge Medications        New Medications        Instructions Start Date   ibuprofen 600 MG tablet  Commonly known as: ADVIL,MOTRIN   600 mg, Oral, Every 6 Hours      NIFEdipine CC 30 MG 24 hr tablet  Commonly known as: ADALAT CC   30 mg, Oral, 2 Times Daily      oxyCODONE 5 MG immediate release tablet  Commonly known as: ROXICODONE   5 mg, Oral, Every 6 Hours PRN             Continue These Medications        Instructions Start Date   prenatal  (CLASSIC) vitamin 28-0.8 MG tablet tablet  Generic drug: prenatal vitamin   Daily             Stop These Medications      Hydrocortisone (Perianal) 2.5 % rectal cream  Commonly known as: Anusol-HC     valACYclovir 500 MG tablet  Commonly known as: Valtrex              Activity at Discharge:   Activity Instructions       Activity as Tolerated      Other Instructions (Specify)      No driving for 2 weeks, No lifting over 10lbs for 6 weeks.    Pelvic Rest      Pelvic rest including no tampons, no tub baths, swimming, and no intercourse until 6 weeks/cleared by Provider.            Follow-up Appointments  Future Appointments   Date Time Provider Department Center   11/21/2025  8:45 AM Constance Mcintyre DO [facility]     Additional Instructions for the Follow-ups that You Need to Schedule       Call MD for problems / concerns.   As directed      Instructions: Call for symptoms related to depression, foul smelling vaginal discharge, fever >/=100.4F, blood clots bigger than a golf ball, saturating through >/= 1 pad per hour, persistent headaches that do not resolve with medication, vision changes like spots or blurry vision, mid epigastric or right upper quadrant abdominal pain, shortness of breath, facial swelling, and/or BP >150/105. If you have any questions or concerns, feel free to contact us.    Order Comments: Instructions: Call for symptoms related to depression, foul smelling vaginal discharge, fever >/=100.4F, blood clots bigger than a golf ball, saturating through >/= 1 pad per hour, persistent headaches that do not resolve with medication, vision changes like spots or blurry vision, mid epigastric or right upper quadrant abdominal pain, shortness of breath, facial swelling, and/or BP >150/105. If you have any questions or concerns, feel free to contact us.         Discharge Follow-up with Specialty: Nurse practitioner in Dr. Loomis's office; 1 Week   As directed      Specialty: Nurse practitioner in   Vldaislav's office   Follow Up: 1 Week   Follow Up Details: BP Check                [facility]  07/15/25  09:41 EDT  [facility]

## 2025-07-15 NOTE — LACTATION NOTE
07/15/25 1120   Maternal Information   Date of Referral 07/15/25   Person Making Referral lactation consultant  (follow up)   Maternal Reason for Referral no prior breastfeeding experience   Infant Reason for Referral  infant  (39w0)   Maternal Assessment   Breast Size Issue none   Breast Shape Bilateral:;round   Breast Density Bilateral:;soft   Nipples Bilateral:;short;other (see comments)  (provided an XS nipple shield to trial on the left side)   Left Nipple Symptoms intact;tender   Right Nipple Symptoms intact;tender  (using silverettes, discussed not wearing constantly, provided gel pads and also soft shells)   Maternal Infant Feeding   Maternal Emotional State independent;receptive;relaxed   Infant Positioning cross-cradle  (right and left with nipple shield)   Signs of Milk Transfer audible swallow;deep jaw excursions noted   Pain with Feeding yes   Pain Location nipples, bilateral  (initial latch, but improves within 30 seconds)   Pain Description soreness   Comfort Measures Before/During Feeding maternal position adjusted  (adjusted hand position and also pillow placement)   Nipple Shape After Feeding, Left Breast round;symmetrical   Nipple Shape After Feeding, Right round;symmetrical   Latch Assistance verbal guidance offered   Support Person Involvement actively supporting mother   Additional Documentation Breastfeeding Supplementation (Group)   Breastfeeding Supplementation   Method of Supplementation paced bottle;syringe  (utilziing a syringe and pacifer.  Also discussed paced bottle feeding)   Milk Expression/Equipment   Breast Pump Type double electric, personal  (s2 and handsfree)   Breast Pump Flange Type hard   Breast Pump Flange Size other (see comments)  (discussed flange inserts)   Breast Pumping   Breast Pumping Interventions post-feed pumping encouraged  (pumping after feeds until milk supply comes in and anytime infant is supplemented)   Breast Pumping bilateral breasts pumped  until soft   Lactation Referrals   Lactation Referrals outpatient lactation program  (encouraged a weighted feed if needed after discharge)     Courtesy revisit with breastfeeding mother that is currently nursing, pumping, and supplementing due to infant weight loss.  Patient encouraged to pump until milk supply comes in and anytime infant receives supplementation.  Discussed triple feeding should be short term, and can call lactation for guidance as needed.  Encouraged an outpatient lactation clinic appointment after discharge if needed.  Answered all questions and concerns at this time.

## 2025-07-16 ENCOUNTER — MATERNAL SCREENING (OUTPATIENT)
Dept: CALL CENTER | Facility: HOSPITAL | Age: 35
End: 2025-07-16
Payer: COMMERCIAL

## 2025-07-16 NOTE — OUTREACH NOTE
Maternal Screening Survey      Flowsheet Row Responses   Eligibility Eligible   Prep survey completed? Yes   Facility patient discharged from? [facility]            [facility]

## 2025-07-21 ENCOUNTER — TELEPHONE (OUTPATIENT)
Dept: LACTATION | Facility: HOSPITAL | Age: 35
End: 2025-07-21
Payer: COMMERCIAL

## 2025-07-21 NOTE — TELEPHONE ENCOUNTER
Return phone call to [place], regarding questions related to breastfeeding, pumping, and giving infant, Fozia a bottle.    Ilsa discussed that Fozia had lost 10% from her birth weight in the hospital, and started to breastfeed, pump for 15 minutes and supplement for 15 minutes with paced bottle feeding.      At 4 days of life, the pediatrician said to continue nursing, pumping, and supplementing through Friday, and then after Friday to feed Fozia on demand.  Over Saturday and Sunday, they reduced the bottle of supplements to a bottle of 15 mls of expressed breast milk after 3 breastfeeding sessions.  Ilsa stated that Fozia is having 10 wet diapers and seems satisfied after breastfeeding for approximately 30 minutes in total between 2 breasts.      Ilsa will return to work at 7 weeks postpartum and wants to make sure she has a supply, and Fozia continues to be willing to take a bottle, while also continuing the breastfeeding relationship.  Discussed continuing one bottle of expressed breast milk per day, that fits best within the family routine.  If Ilsa does not breastfeed prior to the bottle, to make sure she is pumping.  Currently is pumping after feeds and can pump between 1-2 oz per breast, for a total of 13-14 oz per day.      Discussed signs and symptoms of mastitis, and how to gradually decrease pumping sessions slowly, while paying attention to body cues.      Has a pediatrician follow up appointment on 7/28/25.    Ilsa declined a weighted feed at this moment, encouraged calling lactation back as needed.

## 2025-07-24 ENCOUNTER — POSTPARTUM VISIT (OUTPATIENT)
Dept: OBSTETRICS AND GYNECOLOGY | Facility: CLINIC | Age: 35
End: 2025-07-24
Payer: COMMERCIAL

## 2025-07-24 ENCOUNTER — MATERNAL SCREENING (OUTPATIENT)
Dept: CALL CENTER | Facility: HOSPITAL | Age: 35
End: 2025-07-24
Payer: COMMERCIAL

## 2025-07-24 VITALS
HEIGHT: 64 IN | SYSTOLIC BLOOD PRESSURE: 138 MMHG | DIASTOLIC BLOOD PRESSURE: 88 MMHG | WEIGHT: 163 LBS | BODY MASS INDEX: 27.83 KG/M2

## 2025-07-24 DIAGNOSIS — Z98.891 S/P C-SECTION: ICD-10-CM

## 2025-07-24 DIAGNOSIS — Z87.59 HISTORY OF PRE-ECLAMPSIA: Primary | ICD-10-CM

## 2025-07-24 NOTE — OUTREACH NOTE
Maternal Screening Survey      Flowsheet Row Responses   Facility patient discharged from? [facility]   Attempt successful? Yes   Call start time 1041   Call end time 1044   I have been able to laugh and see the funny side of things. 0   I have looked forward with enjoyment to things. 0   I have blamed myself unnecessarily when things went wrong. 0   I have been anxious or worried for no good reason. 0   I have felt scared or panicky for no good reason. 0   Things have been getting on top of me. 0   I have been so unhappy that I have had difficulty sleeping. 0   I have felt sad or miserable. 0   I have been so unhappy that I have been crying. 0   The thought of harming myself has occurred to me. 0   [place]  Depression Scale Total 0   Did any of your parents have problems with alcohol or drug use? No   Do any of your peers have problems with alcohol or drug use? No   Does your partner have problems with alcohol or drug use? No   Before you were pregnant did you have problems with alcohol or drug use? (past) No   In the past month, did you drink beer, wine, liquor or use any other drugs? (pregnancy) No   Maternal Screening call completed Yes              Christiana Hodge Registered Nurse

## 2025-07-24 NOTE — PROGRESS NOTES
"      Chief Complaint   Patient presents with    Postpartum Care       Postpartum Visit         Ilsa Bruno is a 34 y.o.  who presents today for a 2 week(s) postpartum check.      C/S: suspected macro     , Low Transverse   Information for the patient's :  Fozia Bruno [[id number]]   2023   female   Fozia Bruno   3840 g (8 lb 7.5 oz)   Gestational Age: 39w0d     Baby Discharged with Mom  Delivering MD: Dmitriy Cotto MD.    Pregnancy complications: pre-eclampsia / eclampsia    Patient reports her incision is clean, dry, intact. Patient describes vaginal bleeding as light. She is breastfeeding. She would like to discuss the following complaints today: denies issues. She reports that she has had some headaches but not today.  She denies visual changes or epigastric pain.    She desires to discuss her options  for contraception.    Patient denies concerns for postpartum depression/anxiety. Patient denies  suicidal or homicidal ideation. Her postpartum depression screening questionnaire: score=0. No treatment is indicated      The additional following portions of the patient's history were reviewed and updated as appropriate: allergies, current medications, past medical history, past social history, past surgical history, and problem list.      Review of Systems   Constitutional: Negative.    HENT: Negative.     Eyes: Negative.    Respiratory: Negative.     Cardiovascular: Negative.    Gastrointestinal: Negative.    Endocrine: Negative.    Genitourinary: Negative.    Musculoskeletal: Negative.    Skin: Negative.         Abdominal incision   Allergic/Immunologic: Negative.    Neurological: Negative.    Hematological: Negative.    Psychiatric/Behavioral: Negative.         I have reviewed and agree with the HPI, ROS, and historical information as entered above. Eva Alba, APRN      Objective   /88   Ht 162.6 cm (64\")   Wt 73.9 kg (163 lb)   Breastfeeding Yes "   BMI 27.98 kg/m²     Physical Exam  Vitals and nursing note reviewed. Exam conducted with a chaperone present.   Constitutional:       General: She is not in acute distress.     Appearance: Normal appearance. She is well-developed. She is not ill-appearing.   Neck:      Thyroid: No thyroid mass or thyromegaly.   Pulmonary:      Effort: Pulmonary effort is normal. No respiratory distress or retractions.   Chest:      Chest wall: No mass.   Breasts:     Right: Normal. No mass, nipple discharge, skin change or tenderness.      Left: Normal. No mass, nipple discharge, skin change or tenderness.   Abdominal:      General: There is no distension.      Palpations: Abdomen is soft. Abdomen is not rigid. There is no mass.      Tenderness: There is no abdominal tenderness. There is no guarding or rebound.      Hernia: No hernia is present. There is no hernia in the left inguinal area.      Comments: Incision edges well-approximated without redness or drainage   Genitourinary:     General: Normal vulva.      Labia:         Right: No rash, tenderness or lesion.         Left: No rash, tenderness or lesion.       Vagina: Normal. No vaginal discharge or lesions.      Cervix: Normal.      Uterus: Normal. Not enlarged, not fixed and not tender.       Adnexa: Right adnexa normal and left adnexa normal.        Right: No mass or tenderness.          Left: No mass or tenderness.        Rectum: No external hemorrhoid.   Musculoskeletal:      Cervical back: No muscular tenderness.   Skin:     General: Skin is warm and dry.   Neurological:      Mental Status: She is alert and oriented to person, place, and time.   Psychiatric:         Mood and Affect: Mood normal.         Behavior: Behavior normal.              Assessment and Plan    Problem List Items Addressed This Visit          Gravid and     S/P     History of pre-eclampsia - Primary     Other Visit Diagnoses         Postpartum follow-up                S/p  , 2 week(s) postpartum.  Doing well.    Continued pelvic rest with a return to driving and light physical activity.  Baby doing well.  No si/sx of postpartum depression  Rev weaning off PRocardia  Return in about 4 weeks (around 2025).    Eva Alba, APRN  2025

## 2025-08-19 ENCOUNTER — TELEPHONE (OUTPATIENT)
Dept: LACTATION | Facility: HOSPITAL | Age: 35
End: 2025-08-19
Payer: COMMERCIAL

## 2025-08-22 ENCOUNTER — POSTPARTUM VISIT (OUTPATIENT)
Dept: OBSTETRICS AND GYNECOLOGY | Facility: CLINIC | Age: 35
End: 2025-08-22
Payer: COMMERCIAL

## 2025-08-22 VITALS
SYSTOLIC BLOOD PRESSURE: 110 MMHG | HEIGHT: 64 IN | WEIGHT: 160 LBS | BODY MASS INDEX: 27.31 KG/M2 | DIASTOLIC BLOOD PRESSURE: 70 MMHG

## 2025-08-22 DIAGNOSIS — Z98.891 S/P C-SECTION: ICD-10-CM

## 2025-08-22 PROBLEM — Z87.59 HISTORY OF PRE-ECLAMPSIA: Status: RESOLVED | Noted: 2025-07-24 | Resolved: 2025-08-22

## 2025-08-22 PROBLEM — O98.519 HERPES SIMPLEX TYPE 2 (HSV-2) INFECTION AFFECTING PREGNANCY, ANTEPARTUM: Status: RESOLVED | Noted: 2024-12-26 | Resolved: 2025-08-22

## 2025-08-22 PROBLEM — Z34.90 PREGNANCY: Status: RESOLVED | Noted: 2024-12-26 | Resolved: 2025-08-22

## 2025-08-22 PROBLEM — B00.9 HERPES SIMPLEX TYPE 2 (HSV-2) INFECTION AFFECTING PREGNANCY, ANTEPARTUM: Status: RESOLVED | Noted: 2024-12-26 | Resolved: 2025-08-22

## 2025-08-25 LAB — REF LAB TEST METHOD: NORMAL

## 2025-08-26 ENCOUNTER — TELEPHONE (OUTPATIENT)
Dept: LACTATION | Facility: HOSPITAL | Age: 35
End: 2025-08-26
Payer: COMMERCIAL

## 2025-08-28 DIAGNOSIS — N89.8 VAGINAL ITCHING: Primary | ICD-10-CM

## 2025-08-28 RX ORDER — FLUCONAZOLE 150 MG/1
TABLET ORAL
Qty: 2 TABLET | Refills: 0 | Status: SHIPPED | OUTPATIENT
Start: 2025-08-28

## (undated) DEVICE — EXOFIN PRECISION PEN HIGH VISCOSITY TOPICAL SKIN ADHESIVE: Brand: EXOFIN PRECISION PEN, 1G

## (undated) DEVICE — SUT GUT CHRM 1 CTX 36IN 905H

## (undated) DEVICE — PROXIMATE RH ROTATING HEAD SKIN STAPLERS (35 WIDE) CONTAINS 35 STAINLESS STEEL STAPLES: Brand: PROXIMATE

## (undated) DEVICE — SUT VIC 2/0 CT1 27IN J339H BX/36

## (undated) DEVICE — SOL IRR H2O BO 1000ML STRL

## (undated) DEVICE — SOL IRR NACL 0.9PCT BO 1000ML

## (undated) DEVICE — Device

## (undated) DEVICE — GLV SURG BIOGEL LTX PF 6 1/2

## (undated) DEVICE — COATED VICRYL  (POLYGLACTIN 910) SUTURE, VIOLET BRAIDED, STERILE, SYNTHETIC ABSORBABLE SUTURE: Brand: COATED VICRYL

## (undated) DEVICE — SHT AIR TRANSFR COMFRT GLIDE LAT 40X80IN

## (undated) DEVICE — PK C/SECT 10

## (undated) DEVICE — SUT PLAIN  3/0 CT1 27IN 842H